# Patient Record
Sex: FEMALE | URBAN - METROPOLITAN AREA
[De-identification: names, ages, dates, MRNs, and addresses within clinical notes are randomized per-mention and may not be internally consistent; named-entity substitution may affect disease eponyms.]

---

## 2020-07-06 ENCOUNTER — APPOINTMENT (RX ONLY)
Dept: URBAN - METROPOLITAN AREA CLINIC 148 | Facility: CLINIC | Age: 20
Setting detail: DERMATOLOGY
End: 2020-07-06

## 2020-07-06 DIAGNOSIS — L70.0 ACNE VULGARIS: ICD-10-CM

## 2020-07-06 DIAGNOSIS — Z79.899 OTHER LONG TERM (CURRENT) DRUG THERAPY: ICD-10-CM

## 2020-07-06 PROCEDURE — ? ORDER TESTS

## 2020-07-06 PROCEDURE — ? COUNSELING

## 2020-07-06 PROCEDURE — 99203 OFFICE O/P NEW LOW 30 MIN: CPT

## 2020-07-06 PROCEDURE — ? ISOTRETINOIN MONITORING

## 2020-07-06 PROCEDURE — ? PRESCRIPTION

## 2020-07-06 PROCEDURE — ? HIGH RISK MEDICATION MONITORING

## 2020-07-06 RX ORDER — ISOTRETINOIN 30 MG/1
CAPSULE, LIQUID FILLED ORAL
Qty: 60 | Refills: 0 | Status: CANCELLED
Stop reason: ENTERED-IN-ERROR

## 2020-07-06 NOTE — PROCEDURE: HIGH RISK MEDICATION MONITORING
Simponi Counseling:  I discussed with the patient the risks of golimumab including but not limited to myelosuppression, immunosuppression, autoimmune hepatitis, demyelinating diseases, lymphoma, and serious infections.  The patient understands that monitoring is required including a PPD at baseline and must alert us or the primary physician if symptoms of infection or other concerning signs are noted.
Rituxan Pregnancy And Lactation Text: This medication is Pregnancy Category C and it isn't know if it is safe during pregnancy. It is unknown if this medication is excreted in breast milk but similar antibodies are known to be excreted.
Ivermectin Counseling:  Patient instructed to take medication on an empty stomach with a full glass of water.  Patient informed of potential adverse effects including but not limited to nausea, diarrhea, dizziness, itching, and swelling of the extremities or lymph nodes.  The patient verbalized understanding of the proper use and possible adverse effects of ivermectin.  All of the patient's questions and concerns were addressed.
Otezla Pregnancy And Lactation Text: This medication is Pregnancy Category C and it isn't known if it is safe during pregnancy. It is unknown if it is excreted in breast milk.
Hydroquinone Pregnancy And Lactation Text: This medication has not been assigned a Pregnancy Risk Category but animal studies failed to show danger with the topical medication. It is unknown if the medication is excreted in breast milk.
Infliximab Pregnancy And Lactation Text: This medication is Pregnancy Category B and is considered safe during pregnancy. It is unknown if this medication is excreted in breast milk.
Nsaids Pregnancy And Lactation Text: These medications are considered safe up to 30 weeks gestation. It is excreted in breast milk.
Minocycline Counseling: Patient advised regarding possible photosensitivity and discoloration of the teeth, skin, lips, tongue and gums.  Patient instructed to avoid sunlight, if possible.  When exposed to sunlight, patients should wear protective clothing, sunglasses, and sunscreen.  The patient was instructed to call the office immediately if the following severe adverse effects occur:  hearing changes, easy bruising/bleeding, severe headache, or vision changes.  The patient verbalized understanding of the proper use and possible adverse effects of minocycline.  All of the patient's questions and concerns were addressed.
Doxepin Counseling:  Patient advised that the medication is sedating and not to drive a car after taking this medication. Patient informed of potential adverse effects including but not limited to dry mouth, urinary retention, and blurry vision.  The patient verbalized understanding of the proper use and possible adverse effects of doxepin.  All of the patient's questions and concerns were addressed.
Zyclara Pregnancy And Lactation Text: This medication is Pregnancy Category C. It is unknown if this medication is excreted in breast milk.
Cimetidine Counseling:  I discussed with the patient the risks of Cimetidine including but not limited to gynecomastia, headache, diarrhea, nausea, drowsiness, arrhythmias, pancreatitis, skin rashes, psychosis, bone marrow suppression and kidney toxicity.
Bexarotene Pregnancy And Lactation Text: This medication is Pregnancy Category X and should not be given to women who are pregnant or may become pregnant. This medication should not be used if you are breast feeding.
Tetracycline Pregnancy And Lactation Text: This medication is Pregnancy Category D and not consider safe during pregnancy. It is also excreted in breast milk.
Clofazimine Counseling:  I discussed with the patient the risks of clofazimine including but not limited to skin and eye pigmentation, liver damage, nausea/vomiting, gastrointestinal bleeding and allergy.
Thalidomide Pregnancy And Lactation Text: This medication is Pregnancy Category X and is absolutely contraindicated during pregnancy. It is unknown if it is excreted in breast milk.
Use Enhanced Medication Counseling?: No
Griseofulvin Counseling:  I discussed with the patient the risks of griseofulvin including but not limited to photosensitivity, cytopenia, liver damage, nausea/vomiting and severe allergy.  The patient understands that this medication is best absorbed when taken with a fatty meal (e.g., ice cream or french fries).
Carac Counseling:  I discussed with the patient the risks of Carac including but not limited to erythema, scaling, itching, weeping, crusting, and pain.
Bexarotene Counseling:  I discussed with the patient the risks of bexarotene including but not limited to hair loss, dry lips/skin/eyes, liver abnormalities, hyperlipidemia, pancreatitis, depression/suicidal ideation, photosensitivity, drug rash/allergic reactions, hypothyroidism, anemia, leukopenia, infection, cataracts, and teratogenicity.  Patient understands that they will need regular blood tests to check lipid profile, liver function tests, white blood cell count, thyroid function tests and pregnancy test if applicable.
Cellcept Counseling:  I discussed with the patient the risks of mycophenolate mofetil including but not limited to infection/immunosuppression, GI upset, hypokalemia, hypercholesterolemia, bone marrow suppression, lymphoproliferative disorders, malignancy, GI ulceration/bleed/perforation, colitis, interstitial lung disease, kidney failure, progressive multifocal leukoencephalopathy, and birth defects.  The patient understands that monitoring is required including a baseline creatinine and regular CBC testing. In addition, patient must alert us immediately if symptoms of infection or other concerning signs are noted.
Arava Counseling:  Patient counseled regarding adverse effects of Arava including but not limited to nausea, vomiting, abnormalities in liver function tests. Patients may develop mouth sores, rash, diarrhea, and abnormalities in blood counts. The patient understands that monitoring is required including LFTs and blood counts.  There is a rare possibility of scarring of the liver and lung problems that can occur when taking methotrexate. Persistent nausea, loss of appetite, pale stools, dark urine, cough, and shortness of breath should be reported immediately. Patient advised to discontinue Arava treatment and consult with a physician prior to attempting conception. The patient will have to undergo a treatment to eliminate Arava from the body prior to conception.
Stelara Counseling:  I discussed with the patient the risks of ustekinumab including but not limited to immunosuppression, malignancy, posterior leukoencephalopathy syndrome, and serious infections.  The patient understands that monitoring is required including a PPD at baseline and must alert us or the primary physician if symptoms of infection or other concerning signs are noted.
Tetracycline Counseling: Patient counseled regarding possible photosensitivity and increased risk for sunburn.  Patient instructed to avoid sunlight, if possible.  When exposed to sunlight, patients should wear protective clothing, sunglasses, and sunscreen.  The patient was instructed to call the office immediately if the following severe adverse effects occur:  hearing changes, easy bruising/bleeding, severe headache, or vision changes.  The patient verbalized understanding of the proper use and possible adverse effects of tetracycline.  All of the patient's questions and concerns were addressed. Patient understands to avoid pregnancy while on therapy due to potential birth defects.
Protopic Pregnancy And Lactation Text: This medication is Pregnancy Category C. It is unknown if this medication is excreted in breast milk when applied topically.
Birth Control Pills Pregnancy And Lactation Text: This medication should be avoided if pregnant and for the first 30 days post-partum.
Gabapentin Counseling: I discussed with the patient the risks of gabapentin including but not limited to dizziness, somnolence, fatigue and ataxia.
Detail Level: Detailed
Itraconazole Pregnancy And Lactation Text: This medication is Pregnancy Category C and it isn't know if it is safe during pregnancy. It is also excreted in breast milk.
Quinolones Counseling:  I discussed with the patient the risks of fluoroquinolones including but not limited to GI upset, allergic reaction, drug rash, diarrhea, dizziness, photosensitivity, yeast infections, liver function test abnormalities, tendonitis/tendon rupture.
Taltz Counseling: I discussed with the patient the risks of ixekizumab including but not limited to immunosuppression, serious infections, worsening of inflammatory bowel disease and drug reactions.  The patient understands that monitoring is required including a PPD at baseline and must alert us or the primary physician if symptoms of infection or other concerning signs are noted.
Xeljonathanz Pregnancy And Lactation Text: This medication is Pregnancy Category D and is not considered safe during pregnancy.  The risk during breast feeding is also uncertain.
Tremfya Counseling: I discussed with the patient the risks of guselkumab including but not limited to immunosuppression, serious infections, worsening of inflammatory bowel disease and drug reactions.  The patient understands that monitoring is required including a PPD at baseline and must alert us or the primary physician if symptoms of infection or other concerning signs are noted.
Dapsone Counseling: I discussed with the patient the risks of dapsone including but not limited to hemolytic anemia, agranulocytosis, rashes, methemoglobinemia, kidney failure, peripheral neuropathy, headaches, GI upset, and liver toxicity.  Patients who start dapsone require monitoring including baseline LFTs and weekly CBCs for the first month, then every month thereafter.  The patient verbalized understanding of the proper use and possible adverse effects of dapsone.  All of the patient's questions and concerns were addressed.
Benzoyl Peroxide Counseling: Patient counseled that medicine may cause skin irritation and bleach clothing.  In the event of skin irritation, the patient was advised to reduce the amount of the drug applied or use it less frequently.   The patient verbalized understanding of the proper use and possible adverse effects of benzoyl peroxide.  All of the patient's questions and concerns were addressed.
Minoxidil Counseling: Minoxidil is a topical medication which can increase blood flow where it is applied. It is uncertain how this medication increases hair growth. Side effects are uncommon and include stinging and allergic reactions.
High Dose Vitamin A Pregnancy And Lactation Text: High dose vitamin A therapy is contraindicated during pregnancy and breast feeding.
Clindamycin Counseling: I counseled the patient regarding use of clindamycin as an antibiotic for prophylactic and/or therapeutic purposes. Clindamycin is active against numerous classes of bacteria, including skin bacteria. Side effects may include nausea, diarrhea, gastrointestinal upset, rash, hives, yeast infections, and in rare cases, colitis.
Hydroxyzine Counseling: Patient advised that the medication is sedating and not to drive a car after taking this medication.  Patient informed of potential adverse effects including but not limited to dry mouth, urinary retention, and blurry vision.  The patient verbalized understanding of the proper use and possible adverse effects of hydroxyzine.  All of the patient's questions and concerns were addressed.
Erythromycin Pregnancy And Lactation Text: This medication is Pregnancy Category B and is considered safe during pregnancy. It is also excreted in breast milk.
Imiquimod Counseling:  I discussed with the patient the risks of imiquimod including but not limited to erythema, scaling, itching, weeping, crusting, and pain.  Patient understands that the inflammatory response to imiquimod is variable from person to person and was educated regarded proper titration schedule.  If flu-like symptoms develop, patient knows to discontinue the medication and contact us.
Ketoconazole Pregnancy And Lactation Text: This medication is Pregnancy Category C and it isn't know if it is safe during pregnancy. It is also excreted in breast milk and breast feeding isn't recommended.
Gabapentin Pregnancy And Lactation Text: This medication is Pregnancy Category C and isn't considered safe during pregnancy. It is excreted in breast milk.
Zyclara Counseling:  I discussed with the patient the risks of imiquimod including but not limited to erythema, scaling, itching, weeping, crusting, and pain.  Patient understands that the inflammatory response to imiquimod is variable from person to person and was educated regarded proper titration schedule.  If flu-like symptoms develop, patient knows to discontinue the medication and contact us.
Doxycycline Counseling:  Patient counseled regarding possible photosensitivity and increased risk for sunburn.  Patient instructed to avoid sunlight, if possible.  When exposed to sunlight, patients should wear protective clothing, sunglasses, and sunscreen.  The patient was instructed to call the office immediately if the following severe adverse effects occur:  hearing changes, easy bruising/bleeding, severe headache, or vision changes.  The patient verbalized understanding of the proper use and possible adverse effects of doxycycline.  All of the patient's questions and concerns were addressed.
Terbinafine Counseling: Patient counseling regarding adverse effects of terbinafine including but not limited to headache, diarrhea, rash, upset stomach, liver function test abnormalities, itching, taste/smell disturbance, nausea, abdominal pain, and flatulence.  There is a rare possibility of liver failure that can occur when taking terbinafine.  The patient understands that a baseline LFT and kidney function test may be required. The patient verbalized understanding of the proper use and possible adverse effects of terbinafine.  All of the patient's questions and concerns were addressed.
Hydroxychloroquine Pregnancy And Lactation Text: This medication has been shown to cause fetal harm but it isn't assigned a Pregnancy Risk Category. There are small amounts excreted in breast milk.
Cimzia Pregnancy And Lactation Text: This medication crosses the placenta but can be considered safe in certain situations. Cimzia may be excreted in breast milk.
Hydroxychloroquine Counseling:  I discussed with the patient that a baseline ophthalmologic exam is needed at the start of therapy and every year thereafter while on therapy. A CBC may also be warranted for monitoring.  The side effects of this medication were discussed with the patient, including but not limited to agranulocytosis, aplastic anemia, seizures, rashes, retinopathy, and liver toxicity. Patient instructed to call the office should any adverse effect occur.  The patient verbalized understanding of the proper use and possible adverse effects of Plaquenil.  All the patient's questions and concerns were addressed.
Valtrex Pregnancy And Lactation Text: this medication is Pregnancy Category B and is considered safe during pregnancy. This medication is not directly found in breast milk but it's metabolite acyclovir is present.
Ilumya Counseling: I discussed with the patient the risks of tildrakizumab including but not limited to immunosuppression, malignancy, posterior leukoencephalopathy syndrome, and serious infections.  The patient understands that monitoring is required including a PPD at baseline and must alert us or the primary physician if symptoms of infection or other concerning signs are noted.
Itraconazole Counseling:  I discussed with the patient the risks of itraconazole including but not limited to liver damage, nausea/vomiting, neuropathy, and severe allergy.  The patient understands that this medication is best absorbed when taken with acidic beverages such as non-diet cola or ginger ale.  The patient understands that monitoring is required including baseline LFTs and repeat LFTs at intervals.  The patient understands that they are to contact us or the primary physician if concerning signs are noted.
Metronidazole Counseling:  I discussed with the patient the risks of metronidazole including but not limited to seizures, nausea/vomiting, a metallic taste in the mouth, nausea/vomiting and severe allergy.
Cephalexin Counseling: I counseled the patient regarding use of cephalexin as an antibiotic for prophylactic and/or therapeutic purposes. Cephalexin (commonly prescribed under brand name Keflex) is a cephalosporin antibiotic which is active against numerous classes of bacteria, including most skin bacteria. Side effects may include nausea, diarrhea, gastrointestinal upset, rash, hives, yeast infections, and in rare cases, hepatitis, kidney disease, seizures, fever, confusion, neurologic symptoms, and others. Patients with severe allergies to penicillin medications are cautioned that there is about a 10% incidence of cross-reactivity with cephalosporins. When possible, patients with penicillin allergies should use alternatives to cephalosporins for antibiotic therapy.
Rifampin Counseling: I discussed with the patient the risks of rifampin including but not limited to liver damage, kidney damage, red-orange body fluids, nausea/vomiting and severe allergy.
Glycopyrrolate Counseling:  I discussed with the patient the risks of glycopyrrolate including but not limited to skin rash, drowsiness, dry mouth, difficulty urinating, and blurred vision.
Nsaids Counseling: NSAID Counseling: I discussed with the patient that NSAIDs should be taken with food. Prolonged use of NSAIDs can result in the development of stomach ulcers.  Patient advised to stop taking NSAIDs if abdominal pain occurs.  The patient verbalized understanding of the proper use and possible adverse effects of NSAIDs.  All of the patient's questions and concerns were addressed.
Bactrim Pregnancy And Lactation Text: This medication is Pregnancy Category D and is known to cause fetal risk.  It is also excreted in breast milk.
Ilumya Pregnancy And Lactation Text: The risk during pregnancy and breastfeeding is uncertain with this medication.
Bactrim Counseling:  I discussed with the patient the risks of sulfa antibiotics including but not limited to GI upset, allergic reaction, drug rash, diarrhea, dizziness, photosensitivity, and yeast infections.  Rarely, more serious reactions can occur including but not limited to aplastic anemia, agranulocytosis, methemoglobinemia, blood dyscrasias, liver or kidney failure, lung infiltrates or desquamative/blistering drug rashes.
Isotretinoin Counseling: Patient should get monthly blood tests, not donate blood, not drive at night if vision affected, not share medication, and not undergo elective surgery for 6 months after tx completed. Side effects reviewed, pt to contact office should one occur.
Terbinafine Pregnancy And Lactation Text: This medication is Pregnancy Category B and is considered safe during pregnancy. It is also excreted in breast milk and breast feeding isn't recommended.
Spironolactone Counseling: Patient advised regarding risks of diarrhea, abdominal pain, hyperkalemia, birth defects (for female patients), liver toxicity and renal toxicity. The patient may need blood work to monitor liver and kidney function and potassium levels while on therapy. The patient verbalized understanding of the proper use and possible adverse effects of spironolactone.  All of the patient's questions and concerns were addressed.
Cellcept Pregnancy And Lactation Text: This medication is Pregnancy Category D and isn't considered safe during pregnancy. It is unknown if this medication is excreted in breast milk.
Clindamycin Pregnancy And Lactation Text: This medication can be used in pregnancy if certain situations. Clindamycin is also present in breast milk.
Topical Clindamycin Counseling: Patient counseled that this medication may cause skin irritation or allergic reactions.  In the event of skin irritation, the patient was advised to reduce the amount of the drug applied or use it less frequently.   The patient verbalized understanding of the proper use and possible adverse effects of clindamycin.  All of the patient's questions and concerns were addressed.
Skyrizi Counseling: I discussed with the patient the risks of risankizumab-rzaa including but not limited to immunosuppression, and serious infections.  The patient understands that monitoring is required including a PPD at baseline and must alert us or the primary physician if symptoms of infection or other concerning signs are noted.
5-Fu Pregnancy And Lactation Text: This medication is Pregnancy Category X and contraindicated in pregnancy and in women who may become pregnant. It is unknown if this medication is excreted in breast milk.
Cephalexin Pregnancy And Lactation Text: This medication is Pregnancy Category B and considered safe during pregnancy.  It is also excreted in breast milk but can be used safely for shorter doses.
Sski Pregnancy And Lactation Text: This medication is Pregnancy Category D and isn't considered safe during pregnancy. It is excreted in breast milk.
Spironolactone Pregnancy And Lactation Text: This medication can cause feminization of the male fetus and should be avoided during pregnancy. The active metabolite is also found in breast milk.
Hydroquinone Counseling:  Patient advised that medication may result in skin irritation, lightening (hypopigmentation), dryness, and burning.  In the event of skin irritation, the patient was advised to reduce the amount of the drug applied or use it less frequently.  Rarely, spots that are treated with hydroquinone can become darker (pseudoochronosis).  Should this occur, patient instructed to stop medication and call the office. The patient verbalized understanding of the proper use and possible adverse effects of hydroquinone.  All of the patient's questions and concerns were addressed.
Siliq Counseling:  I discussed with the patient the risks of Siliq including but not limited to new or worsening depression, suicidal thoughts and behavior, immunosuppression, malignancy, posterior leukoencephalopathy syndrome, and serious infections.  The patient understands that monitoring is required including a PPD at baseline and must alert us or the primary physician if symptoms of infection or other concerning signs are noted. There is also a special program designed to monitor depression which is required with Siliq.
Erivedge Counseling- I discussed with the patient the risks of Erivedge including but not limited to nausea, vomiting, diarrhea, constipation, weight loss, changes in the sense of taste, decreased appetite, muscle spasms, and hair loss.  The patient verbalized understanding of the proper use and possible adverse effects of Erivedge.  All of the patient's questions and concerns were addressed.
Oxybutynin Counseling:  I discussed with the patient the risks of oxybutynin including but not limited to skin rash, drowsiness, dry mouth, difficulty urinating, and blurred vision.
Doxycycline Pregnancy And Lactation Text: This medication is Pregnancy Category D and not consider safe during pregnancy. It is also excreted in breast milk but is considered safe for shorter treatment courses.
Tazorac Pregnancy And Lactation Text: This medication is not safe during pregnancy. It is unknown if this medication is excreted in breast milk.
Rituxan Counseling:  I discussed with the patient the risks of Rituxan infusions. Side effects can include infusion reactions, severe drug rashes including mucocutaneous reactions, reactivation of latent hepatitis and other infections and rarely progressive multifocal leukoencephalopathy.  All of the patient's questions and concerns were addressed.
Hydroxyzine Pregnancy And Lactation Text: This medication is not safe during pregnancy and should not be taken. It is also excreted in breast milk and breast feeding isn't recommended.
Dupixent Pregnancy And Lactation Text: This medication likely crosses the placenta but the risk for the fetus is uncertain. This medication is excreted in breast milk.
Cyclophosphamide Pregnancy And Lactation Text: This medication is Pregnancy Category D and it isn't considered safe during pregnancy. This medication is excreted in breast milk.
Eucrisa Counseling: Patient may experience a mild burning sensation during topical application. Eucrisa is not approved in children less than 2 years of age.
Protopic Counseling: Patient may experience a mild burning sensation during topical application. Protopic is not approved in children less than 2 years of age. There have been case reports of hematologic and skin malignancies in patients using topical calcineurin inhibitors although causality is questionable.
SSKI Counseling:  I discussed with the patient the risks of SSKI including but not limited to thyroid abnormalities, metallic taste, GI upset, fever, headache, acne, arthralgias, paraesthesias, lymphadenopathy, easy bleeding, arrhythmias, and allergic reaction.
Erythromycin Counseling:  I discussed with the patient the risks of erythromycin including but not limited to GI upset, allergic reaction, drug rash, diarrhea, increase in liver enzymes, and yeast infections.
Oxybutynin Pregnancy And Lactation Text: This medication is Pregnancy Category B and is considered safe during pregnancy. It is unknown if it is excreted in breast milk.
Doxepin Pregnancy And Lactation Text: This medication is Pregnancy Category C and it isn't known if it is safe during pregnancy. It is also excreted in breast milk and breast feeding isn't recommended.
Cosentyx Counseling:  I discussed with the patient the risks of Cosentyx including but not limited to worsening of Crohn's disease, immunosuppression, allergic reactions and infections.  The patient understands that monitoring is required including a PPD at baseline and must alert us or the primary physician if symptoms of infection or other concerning signs are noted.
Rifampin Pregnancy And Lactation Text: This medication is Pregnancy Category C and it isn't know if it is safe during pregnancy. It is also excreted in breast milk and should not be used if you are breast feeding.
Solaraze Pregnancy And Lactation Text: This medication is Pregnancy Category B and is considered safe. There is some data to suggest avoiding during the third trimester. It is unknown if this medication is excreted in breast milk.
Xolair Pregnancy And Lactation Text: This medication is Pregnancy Category B and is considered safe during pregnancy. This medication is excreted in breast milk.
Birth Control Pills Counseling: Birth Control Pill Counseling: I discussed with the patient the potential side effects of OCPs including but not limited to increased risk of stroke, heart attack, thrombophlebitis, deep venous thrombosis, hepatic adenomas, breast changes, GI upset, headaches, and depression.  The patient verbalized understanding of the proper use and possible adverse effects of OCPs. All of the patient's questions and concerns were addressed.
Azithromycin Counseling:  I discussed with the patient the risks of azithromycin including but not limited to GI upset, allergic reaction, drug rash, diarrhea, and yeast infections.
Xeljanz Counseling: I discussed with the patient the risks of Xeljanz therapy including increased risk of infection, liver issues, headache, diarrhea, or cold symptoms. Live vaccines should be avoided. They were instructed to call if they have any problems.
Xolair Counseling:  Patient informed of potential adverse effects including but not limited to fever, muscle aches, rash and allergic reactions.  The patient verbalized understanding of the proper use and possible adverse effects of Xolair.  All of the patient's questions and concerns were addressed.
Cyclosporine Counseling:  I discussed with the patient the risks of cyclosporine including but not limited to hypertension, gingival hyperplasia,myelosuppression, immunosuppression, liver damage, kidney damage, neurotoxicity, lymphoma, and serious infections. The patient understands that monitoring is required including baseline blood pressure, CBC, CMP, lipid panel and uric acid, and then 1-2 times monthly CMP and blood pressure.
Metronidazole Pregnancy And Lactation Text: This medication is Pregnancy Category B and considered safe during pregnancy.  It is also excreted in breast milk.
Acitretin Pregnancy And Lactation Text: This medication is Pregnancy Category X and should not be given to women who are pregnant or may become pregnant in the future. This medication is excreted in breast milk.
Cyclosporine Pregnancy And Lactation Text: This medication is Pregnancy Category C and it isn't know if it is safe during pregnancy. This medication is excreted in breast milk.
5-Fu Counseling: 5-Fluorouracil Counseling:  I discussed with the patient the risks of 5-fluorouracil including but not limited to erythema, scaling, itching, weeping, crusting, and pain.
Odomzo Counseling- I discussed with the patient the risks of Odomzo including but not limited to nausea, vomiting, diarrhea, constipation, weight loss, changes in the sense of taste, decreased appetite, muscle spasms, and hair loss.  The patient verbalized understanding of the proper use and possible adverse effects of Odomzo.  All of the patient's questions and concerns were addressed.
Methotrexate Counseling:  Patient counseled regarding adverse effects of methotrexate including but not limited to nausea, vomiting, abnormalities in liver function tests. Patients may develop mouth sores, rash, diarrhea, and abnormalities in blood counts. The patient understands that monitoring is required including LFT's and blood counts.  There is a rare possibility of scarring of the liver and lung problems that can occur when taking methotrexate. Persistent nausea, loss of appetite, pale stools, dark urine, cough, and shortness of breath should be reported immediately. Patient advised to discontinue methotrexate treatment at least three months before attempting to become pregnant.  I discussed the need for folate supplements while taking methotrexate.  These supplements can decrease side effects during methotrexate treatment. The patient verbalized understanding of the proper use and possible adverse effects of methotrexate.  All of the patient's questions and concerns were addressed.
Griseofulvin Pregnancy And Lactation Text: This medication is Pregnancy Category X and is known to cause serious birth defects. It is unknown if this medication is excreted in breast milk but breast feeding should be avoided.
Enbrel Counseling:  I discussed with the patient the risks of etanercept including but not limited to myelosuppression, immunosuppression, autoimmune hepatitis, demyelinating diseases, lymphoma, and infections.  The patient understands that monitoring is required including a PPD at baseline and must alert us or the primary physician if symptoms of infection or other concerning signs are noted.
Elidel Counseling: Patient may experience a mild burning sensation during topical application. Elidel is not approved in children less than 2 years of age. There have been case reports of hematologic and skin malignancies in patients using topical calcineurin inhibitors although causality is questionable.
Albendazole Pregnancy And Lactation Text: This medication is Pregnancy Category C and it isn't known if it is safe during pregnancy. It is also excreted in breast milk.
Dapsone Pregnancy And Lactation Text: This medication is Pregnancy Category C and is not considered safe during pregnancy or breast feeding.
Methotrexate Pregnancy And Lactation Text: This medication is Pregnancy Category X and is known to cause fetal harm. This medication is excreted in breast milk.
Thalidomide Counseling: I discussed with the patient the risks of thalidomide including but not limited to birth defects, anxiety, weakness, chest pain, dizziness, cough and severe allergy.
Picato Counseling:  I discussed with the patient the risks of Picato including but not limited to erythema, scaling, itching, weeping, crusting, and pain.
Ketoconazole Counseling:   Patient counseled regarding improving absorption with orange juice.  Adverse effects include but are not limited to breast enlargement, headache, diarrhea, nausea, upset stomach, liver function test abnormalities, taste disturbance, and stomach pain.  There is a rare possibility of liver failure that can occur when taking ketoconazole. The patient understands that monitoring of LFTs may be required, especially at baseline. The patient verbalized understanding of the proper use and possible adverse effects of ketoconazole.  All of the patient's questions and concerns were addressed.
Azithromycin Pregnancy And Lactation Text: This medication is considered safe during pregnancy and is also secreted in breast milk.
Drysol Counseling:  I discussed with the patient the risks of drysol/aluminum chloride including but not limited to skin rash, itching, irritation, burning.
High Dose Vitamin A Counseling: Side effects reviewed, pt to contact office should one occur.
Sarecycline Counseling: Patient advised regarding possible photosensitivity and discoloration of the teeth, skin, lips, tongue and gums.  Patient instructed to avoid sunlight, if possible.  When exposed to sunlight, patients should wear protective clothing, sunglasses, and sunscreen.  The patient was instructed to call the office immediately if the following severe adverse effects occur:  hearing changes, easy bruising/bleeding, severe headache, or vision changes.  The patient verbalized understanding of the proper use and possible adverse effects of sarecycline.  All of the patient's questions and concerns were addressed.
Glycopyrrolate Pregnancy And Lactation Text: This medication is Pregnancy Category B and is considered safe during pregnancy. It is unknown if it is excreted breast milk.
Solaraze Counseling:  I discussed with the patient the risks of Solaraze including but not limited to erythema, scaling, itching, weeping, crusting, and pain.
Fluconazole Counseling:  Patient counseled regarding adverse effects of fluconazole including but not limited to headache, diarrhea, nausea, upset stomach, liver function test abnormalities, taste disturbance, and stomach pain.  There is a rare possibility of liver failure that can occur when taking fluconazole.  The patient understands that monitoring of LFTs and kidney function test may be required, especially at baseline. The patient verbalized understanding of the proper use and possible adverse effects of fluconazole.  All of the patient's questions and concerns were addressed.
Benzoyl Peroxide Pregnancy And Lactation Text: This medication is Pregnancy Category C. It is unknown if benzoyl peroxide is excreted in breast milk.
Tazorac Counseling:  Patient advised that medication is irritating and drying.  Patient may need to apply sparingly and wash off after an hour before eventually leaving it on overnight.  The patient verbalized understanding of the proper use and possible adverse effects of tazorac.  All of the patient's questions and concerns were addressed.
Cyclophosphamide Counseling:  I discussed with the patient the risks of cyclophosphamide including but not limited to hair loss, hormonal abnormalities, decreased fertility, abdominal pain, diarrhea, nausea and vomiting, bone marrow suppression and infection. The patient understands that monitoring is required while taking this medication.
Topical Sulfur Applications Pregnancy And Lactation Text: This medication is Pregnancy Category C and has an unknown safety profile during pregnancy. It is unknown if this topical medication is excreted in breast milk.
Dupixent Counseling: I discussed with the patient the risks of dupilumab including but not limited to eye infection and irritation, cold sores, injection site reactions, worsening of asthma, allergic reactions and increased risk of parasitic infection.  Live vaccines should be avoided while taking dupilumab. Dupilumab will also interact with certain medications such as warfarin and cyclosporine. The patient understands that monitoring is required and they must alert us or the primary physician if symptoms of infection or other concerning signs are noted.
Colchicine Counseling:  Patient counseled regarding adverse effects including but not limited to stomach upset (nausea, vomiting, stomach pain, or diarrhea).  Patient instructed to limit alcohol consumption while taking this medication.  Colchicine may reduce blood counts especially with prolonged use.  The patient understands that monitoring of kidney function and blood counts may be required, especially at baseline. The patient verbalized understanding of the proper use and possible adverse effects of colchicine.  All of the patient's questions and concerns were addressed.
Acitretin Counseling:  I discussed with the patient the risks of acitretin including but not limited to hair loss, dry lips/skin/eyes, liver damage, hyperlipidemia, depression/suicidal ideation, photosensitivity.  Serious rare side effects can include but are not limited to pancreatitis, pseudotumor cerebri, bony changes, clot formation/stroke/heart attack.  Patient understands that alcohol is contraindicated since it can result in liver toxicity and significantly prolong the elimination of the drug by many years.
Otezla Counseling: The side effects of Otezla were discussed with the patient, including but not limited to worsening or new depression, weight loss, diarrhea, nausea, upper respiratory tract infection, and headache. Patient instructed to call the office should any adverse effect occur.  The patient verbalized understanding of the proper use and possible adverse effects of Otezla.  All the patient's questions and concerns were addressed.
Topical Retinoid counseling:  Patient advised to apply a pea-sized amount only at bedtime and wait 30 minutes after washing their face before applying.  If too drying, patient may add a non-comedogenic moisturizer. The patient verbalized understanding of the proper use and possible adverse effects of retinoids.  All of the patient's questions and concerns were addressed.
Cimzia Counseling:  I discussed with the patient the risks of Cimzia including but not limited to immunosuppression, allergic reactions and infections.  The patient understands that monitoring is required including a PPD at baseline and must alert us or the primary physician if symptoms of infection or other concerning signs are noted.
Azathioprine Counseling:  I discussed with the patient the risks of azathioprine including but not limited to myelosuppression, immunosuppression, hepatotoxicity, lymphoma, and infections.  The patient understands that monitoring is required including baseline LFTs, Creatinine, possible TPMP genotyping and weekly CBCs for the first month and then every 2 weeks thereafter.  The patient verbalized understanding of the proper use and possible adverse effects of azathioprine.  All of the patient's questions and concerns were addressed.
Isotretinoin Pregnancy And Lactation Text: This medication is Pregnancy Category X and is considered extremely dangerous during pregnancy. It is unknown if it is excreted in breast milk.
Prednisone Counseling:  I discussed with the patient the risks of prolonged use of prednisone including but not limited to weight gain, insomnia, osteoporosis, mood changes, diabetes, susceptibility to infection, glaucoma and high blood pressure.  In cases where prednisone use is prolonged, patients should be monitored with blood pressure checks, serum glucose levels and an eye exam.  Additionally, the patient may need to be placed on GI prophylaxis, PCP prophylaxis, and calcium and vitamin D supplementation and/or a bisphosphonate.  The patient verbalized understanding of the proper use and the possible adverse effects of prednisone.  All of the patient's questions and concerns were addressed.
Topical Sulfur Applications Counseling: Topical Sulfur Counseling: Patient counseled that this medication may cause skin irritation or allergic reactions.  In the event of skin irritation, the patient was advised to reduce the amount of the drug applied or use it less frequently.   The patient verbalized understanding of the proper use and possible adverse effects of topical sulfur application.  All of the patient's questions and concerns were addressed.
Humira Counseling:  I discussed with the patient the risks of adalimumab including but not limited to myelosuppression, immunosuppression, autoimmune hepatitis, demyelinating diseases, lymphoma, and serious infections.  The patient understands that monitoring is required including a PPD at baseline and must alert us or the primary physician if symptoms of infection or other concerning signs are noted.
Valtrex Counseling: I discussed with the patient the risks of valacyclovir including but not limited to kidney damage, nausea, vomiting and severe allergy.  The patient understands that if the infection seems to be worsening or is not improving, they are to call.
Infliximab Counseling:  I discussed with the patient the risks of infliximab including but not limited to myelosuppression, immunosuppression, autoimmune hepatitis, demyelinating diseases, lymphoma, and serious infections.  The patient understands that monitoring is required including a PPD at baseline and must alert us or the primary physician if symptoms of infection or other concerning signs are noted.
Albendazole Counseling:  I discussed with the patient the risks of albendazole including but not limited to cytopenia, kidney damage, nausea/vomiting and severe allergy.  The patient understands that this medication is being used in an off-label manner.
Drysol Pregnancy And Lactation Text: This medication is considered safe during pregnancy and breast feeding.

## 2020-07-06 NOTE — PROCEDURE: COUNSELING
Topical Clindamycin Pregnancy And Lactation Text: This medication is Pregnancy Category B and is considered safe during pregnancy. It is unknown if it is excreted in breast milk.
Minocycline Pregnancy And Lactation Text: This medication is Pregnancy Category D and not consider safe during pregnancy. It is also excreted in breast milk.
Dapsone Counseling: I discussed with the patient the risks of dapsone including but not limited to hemolytic anemia, agranulocytosis, rashes, methemoglobinemia, kidney failure, peripheral neuropathy, headaches, GI upset, and liver toxicity.  Patients who start dapsone require monitoring including baseline LFTs and weekly CBCs for the first month, then every month thereafter.  The patient verbalized understanding of the proper use and possible adverse effects of dapsone.  All of the patient's questions and concerns were addressed.
Topical Retinoid counseling:  Patient advised to apply a pea-sized amount only at bedtime and wait 30 minutes after washing their face before applying.  If too drying, patient may add a non-comedogenic moisturizer. The patient verbalized understanding of the proper use and possible adverse effects of retinoids.  All of the patient's questions and concerns were addressed.
Topical Sulfur Applications Pregnancy And Lactation Text: This medication is Pregnancy Category C and has an unknown safety profile during pregnancy. It is unknown if this topical medication is excreted in breast milk.
Doxycycline Counseling:  Patient counseled regarding possible photosensitivity and increased risk for sunburn.  Patient instructed to avoid sunlight, if possible.  When exposed to sunlight, patients should wear protective clothing, sunglasses, and sunscreen.  The patient was instructed to call the office immediately if the following severe adverse effects occur:  hearing changes, easy bruising/bleeding, severe headache, or vision changes.  The patient verbalized understanding of the proper use and possible adverse effects of doxycycline.  All of the patient's questions and concerns were addressed.
Doxycycline Pregnancy And Lactation Text: This medication is Pregnancy Category D and not consider safe during pregnancy. It is also excreted in breast milk but is considered safe for shorter treatment courses.
Azithromycin Pregnancy And Lactation Text: This medication is considered safe during pregnancy and is also secreted in breast milk.
High Dose Vitamin A Counseling: Side effects reviewed, pt to contact office should one occur.
Detail Level: Detailed
Benzoyl Peroxide Pregnancy And Lactation Text: This medication is Pregnancy Category C. It is unknown if benzoyl peroxide is excreted in breast milk.
Sarecycline Counseling: Patient advised regarding possible photosensitivity and discoloration of the teeth, skin, lips, tongue and gums.  Patient instructed to avoid sunlight, if possible.  When exposed to sunlight, patients should wear protective clothing, sunglasses, and sunscreen.  The patient was instructed to call the office immediately if the following severe adverse effects occur:  hearing changes, easy bruising/bleeding, severe headache, or vision changes.  The patient verbalized understanding of the proper use and possible adverse effects of sarecycline.  All of the patient's questions and concerns were addressed.
Spironolactone Pregnancy And Lactation Text: This medication can cause feminization of the male fetus and should be avoided during pregnancy. The active metabolite is also found in breast milk.
Isotretinoin Counseling: Patient should get monthly blood tests, not donate blood, not drive at night if vision affected, not share medication, and not undergo elective surgery for 6 months after tx completed. Side effects reviewed, pt to contact office should one occur.
Erythromycin Counseling:  I discussed with the patient the risks of erythromycin including but not limited to GI upset, allergic reaction, drug rash, diarrhea, increase in liver enzymes, and yeast infections.
Include Pregnancy/Lactation Warning?: No
Spironolactone Counseling: Patient advised regarding risks of diarrhea, abdominal pain, hyperkalemia, birth defects (for female patients), liver toxicity and renal toxicity. The patient may need blood work to monitor liver and kidney function and potassium levels while on therapy. The patient verbalized understanding of the proper use and possible adverse effects of spironolactone.  All of the patient's questions and concerns were addressed.
Isotretinoin Pregnancy And Lactation Text: This medication is Pregnancy Category X and is considered extremely dangerous during pregnancy. It is unknown if it is excreted in breast milk.
Bactrim Pregnancy And Lactation Text: This medication is Pregnancy Category D and is known to cause fetal risk.  It is also excreted in breast milk.
Birth Control Pills Pregnancy And Lactation Text: This medication should be avoided if pregnant and for the first 30 days post-partum.
Topical Clindamycin Counseling: Patient counseled that this medication may cause skin irritation or allergic reactions.  In the event of skin irritation, the patient was advised to reduce the amount of the drug applied or use it less frequently.   The patient verbalized understanding of the proper use and possible adverse effects of clindamycin.  All of the patient's questions and concerns were addressed.
Tazorac Counseling:  Patient advised that medication is irritating and drying.  Patient may need to apply sparingly and wash off after an hour before eventually leaving it on overnight.  The patient verbalized understanding of the proper use and possible adverse effects of tazorac.  All of the patient's questions and concerns were addressed.
Dapsone Pregnancy And Lactation Text: This medication is Pregnancy Category C and is not considered safe during pregnancy or breast feeding.
Topical Retinoid Pregnancy And Lactation Text: This medication is Pregnancy Category C. It is unknown if this medication is excreted in breast milk.
Bactrim Counseling:  I discussed with the patient the risks of sulfa antibiotics including but not limited to GI upset, allergic reaction, drug rash, diarrhea, dizziness, photosensitivity, and yeast infections.  Rarely, more serious reactions can occur including but not limited to aplastic anemia, agranulocytosis, methemoglobinemia, blood dyscrasias, liver or kidney failure, lung infiltrates or desquamative/blistering drug rashes.
High Dose Vitamin A Pregnancy And Lactation Text: High dose vitamin A therapy is contraindicated during pregnancy and breast feeding.
Minocycline Counseling: Patient advised regarding possible photosensitivity and discoloration of the teeth, skin, lips, tongue and gums.  Patient instructed to avoid sunlight, if possible.  When exposed to sunlight, patients should wear protective clothing, sunglasses, and sunscreen.  The patient was instructed to call the office immediately if the following severe adverse effects occur:  hearing changes, easy bruising/bleeding, severe headache, or vision changes.  The patient verbalized understanding of the proper use and possible adverse effects of minocycline.  All of the patient's questions and concerns were addressed.
Tetracycline Counseling: Patient counseled regarding possible photosensitivity and increased risk for sunburn.  Patient instructed to avoid sunlight, if possible.  When exposed to sunlight, patients should wear protective clothing, sunglasses, and sunscreen.  The patient was instructed to call the office immediately if the following severe adverse effects occur:  hearing changes, easy bruising/bleeding, severe headache, or vision changes.  The patient verbalized understanding of the proper use and possible adverse effects of tetracycline.  All of the patient's questions and concerns were addressed. Patient understands to avoid pregnancy while on therapy due to potential birth defects.
Tazorac Pregnancy And Lactation Text: This medication is not safe during pregnancy. It is unknown if this medication is excreted in breast milk.
Topical Sulfur Applications Counseling: Topical Sulfur Counseling: Patient counseled that this medication may cause skin irritation or allergic reactions.  In the event of skin irritation, the patient was advised to reduce the amount of the drug applied or use it less frequently.   The patient verbalized understanding of the proper use and possible adverse effects of topical sulfur application.  All of the patient's questions and concerns were addressed.
Benzoyl Peroxide Counseling: Patient counseled that medicine may cause skin irritation and bleach clothing.  In the event of skin irritation, the patient was advised to reduce the amount of the drug applied or use it less frequently.   The patient verbalized understanding of the proper use and possible adverse effects of benzoyl peroxide.  All of the patient's questions and concerns were addressed.
Erythromycin Pregnancy And Lactation Text: This medication is Pregnancy Category B and is considered safe during pregnancy. It is also excreted in breast milk.
Birth Control Pills Counseling: Birth Control Pill Counseling: I discussed with the patient the potential side effects of OCPs including but not limited to increased risk of stroke, heart attack, thrombophlebitis, deep venous thrombosis, hepatic adenomas, breast changes, GI upset, headaches, and depression.  The patient verbalized understanding of the proper use and possible adverse effects of OCPs. All of the patient's questions and concerns were addressed.
Azithromycin Counseling:  I discussed with the patient the risks of azithromycin including but not limited to GI upset, allergic reaction, drug rash, diarrhea, and yeast infections.

## 2020-07-06 NOTE — PROCEDURE: ISOTRETINOIN MONITORING
Any Myalgia?: No
Retinoid Dermatitis Normal Treatment: I recommended more frequent application of Cetaphil or CeraVe to the areas of dermatitis.
Nosebleeds Normal Treatment: I explained this is common when taking isotretinoin. I recommended saline mist in each nostril multiple times a day. If this worsens they will contact us.
Any Nosebleeds?: Yes - Normal Treatment
Hypercholesterolemia Monitoring: I explained this is common when taking isotretinoin. We will monitor closely.
Myalgia Monitoring: I explained this is common when taking isotretinoin. If this worsens they will contact us.
Female Pregnancy Counseling Text: Female patients should also be on two forms of birth control while taking this medication and for one month after their last dose.
Dosing Month 2 (Required For Cumulative Dosing): 30mg BID
Add Associated Diagnosis When Managing Medication Side Effects: Yes
Ipledge Number (Optional): [id number]
Next Month's Dosage: Continue Current Dosage
Cheilitis Aggressive Treatment: I recommended application of Vaseline or Aquaphor numerous times a day (as often as every hour) and before going to bed. I also prescribed a topical steroid for twice daily use.
Lower Range (In Mg/Kg): 120
Pounds Preamble Statement (Weight Entered In Details Tab): Reported Weight in pounds:
Upper Range (In Mg/Kg): 150
Hypertriglyceridemia Treatment: I explained this is common when taking isotretinoin. If this worsens they will contact us. They may try OTC ibuprofen.
Xerosis Aggressive Treatment: I recommended application of Cetaphil or CeraVe numerous times a day going to bed to all dry areas. I also prescribed a topical steroid for twice daily use.
Xerosis Normal Treatment: I recommended application of Cetaphil or CeraVe numerous times a day and before going to bed to all dry areas.
Kilograms Preamble Statement (Weight Entered In Details Tab): Reported Weight in kilograms:
Female Completion Statement: After discussing her treatment course we decided to discontinue isotretinoin therapy at this time. I explained that she would need to continue her birth control methods for at least one month after the last dosage. She should also get a pregnancy test one month after the last dose. She shouldn't donate blood for one month after the last dose. She should call with any new symptoms of depression.
Are Labs Available For Review?: No- Not Drawn Yet
Months Of Therapy Completed: 3
Weight Units: pounds
Detail Level: Detailed
Headache Monitoring: I recommended monitoring the headaches for now. There is no evidence of increased intracranial pressure. They were instructed to call if the headaches are worsening.
Target Cumulative Dosage (In Mg/Kg): 135
What Is The Patient's Gender: Female
Retinoid Dermatitis Aggressive Treatment: I recommended more frequent application of Cetaphil or CeraVe to the areas of dermatitis. I also prescribed a topical steroid for twice daily use until the dermatitis resolves.
Use Therapeutic Ranged Or Therapeutic Target: please select Range or Target
Male Completion Statement: After discussing his treatment course we decided to discontinue isotretinoin therapy at this time. He shouldn't donate blood for one month after the last dose. He should call with any new symptoms of depression.
Counseling Text: I reviewed the side effect in detail. Patient should get monthly blood tests, not donate blood, not drive at night if vision affected, and not share medication.
Comments: [place]. Per patient she is on 2nd month of therapy. Patient to request records to confirm dosage. Will need blood work completed and can be cleared in ipledge after confirmation.\\nIf labs normal will continue 30mgbid
Xerosis Normal Treatment: I recommended application of Cetaphil or CeraVe numerous times a day going to bed to all dry areas.
Cheilitis Normal Treatment: I recommended application of Vaseline or Aquaphor numerous times a day (as often as every hour) and before going to bed.
Xerosis Aggressive Treatment: I recommended application of Cetaphil or CeraVe numerous times a day and before going to bed to all dry areas. I also prescribed a topical steroid for twice daily use.
Patient Weight (Optional But Required For Cumulative Dose-Numbers And Decimals Only): 140

## 2020-07-21 ENCOUNTER — APPOINTMENT (RX ONLY)
Dept: URBAN - METROPOLITAN AREA CLINIC 148 | Facility: CLINIC | Age: 20
Setting detail: DERMATOLOGY
End: 2020-07-21

## 2020-07-21 DIAGNOSIS — L70.0 ACNE VULGARIS: ICD-10-CM

## 2020-07-21 DIAGNOSIS — Z79.899 OTHER LONG TERM (CURRENT) DRUG THERAPY: ICD-10-CM

## 2020-07-21 PROCEDURE — ? COUNSELING

## 2020-07-21 PROCEDURE — ? ISOTRETINOIN MONITORING

## 2020-07-21 PROCEDURE — ? ORDER TESTS

## 2020-07-21 PROCEDURE — ? PRESCRIPTION

## 2020-07-21 PROCEDURE — 99214 OFFICE O/P EST MOD 30 MIN: CPT

## 2020-07-21 RX ORDER — ISOTRETINOIN 30 MG/1
CAPSULE, LIQUID FILLED ORAL
Qty: 60 | Refills: 0 | Status: ERX | COMMUNITY
Start: 2020-07-21

## 2020-07-21 RX ADMIN — ISOTRETINOIN: 30 CAPSULE, LIQUID FILLED ORAL at 00:00

## 2020-07-21 NOTE — PROCEDURE: ISOTRETINOIN MONITORING
Female Pregnancy Counseling Text: Female patients should also be on two forms of birth control while taking this medication and for one month after their last dose.
Show How Many Months Of Anticipated Therapy Are Left: No
Detail Level: Detailed
Retinoid Dermatitis Aggressive Treatment: I recommended more frequent application of Cetaphil or CeraVe to the areas of dermatitis. I also prescribed a topical steroid for twice daily use until the dermatitis resolves.
Are Labs Available For Review?: Yes
Ipledge Number (Optional): [id number]
Hypertriglyceridemia Monitoring: I explained this is common when taking isotretinoin. We will monitor closely.
Hypertriglyceridemia Monitoring: I explained this is common when taking isotretinoin. If this worsens they will contact us.
Myalgia Treatment: I explained this is common when taking isotretinoin. If this worsens they will contact us. They may try OTC ibuprofen.
Male Completion Statement: After discussing his treatment course we decided to discontinue isotretinoin therapy at this time. He shouldn't donate blood for one month after the last dose. He should call with any new symptoms of depression.
Comments: Patient completed 2 months of Accutane 30mg BID with out of state provider. Paperwork was not faxed to us on time therefore we were not able to continue treatment last visit. Will continue with the 3rd of month of therapy.
Dosing Month 3 (Required For Cumulative Dosing): 30mg BID
Xerosis Normal Treatment: I recommended application of Cetaphil or CeraVe numerous times a day going to bed to all dry areas.
Kilograms Preamble Statement (Weight Entered In Details Tab): Reported Weight in kilograms:
Nosebleeds Normal Treatment: I explained this is common when taking isotretinoin. I recommended saline mist in each nostril multiple times a day. If this worsens they will contact us.
Female Completion Statement: After discussing her treatment course we decided to discontinue isotretinoin therapy at this time. I explained that she would need to continue her birth control methods for at least one month after the last dosage. She should also get a pregnancy test one month after the last dose. She shouldn't donate blood for one month after the last dose. She should call with any new symptoms of depression.
Lower Range (In Mg/Kg): 120
Cheilitis Normal Treatment: I recommended application of Vaseline or Aquaphor numerous times a day (as often as every hour) and before going to bed.
Cheilitis Aggressive Treatment: I recommended application of Vaseline or Aquaphor numerous times a day (as often as every hour) and before going to bed. I also prescribed a topical steroid for twice daily use.
Headache Monitoring: I recommended monitoring the headaches for now. There is no evidence of increased intracranial pressure. They were instructed to call if the headaches are worsening.
Upper Range (In Mg/Kg): 150
Any Nosebleeds?: Yes - Normal Treatment
Retinoid Dermatitis Normal Treatment: I recommended more frequent application of Cetaphil or CeraVe to the areas of dermatitis.
Xerosis Normal Treatment: I recommended application of Cetaphil or CeraVe numerous times a day and before going to bed to all dry areas.
Target Cumulative Dosage (In Mg/Kg): 135
Weight Units: pounds
Months Of Therapy Completed: 3
Next Month's Dosage: Continue Current Dosage
Counseling Text: I reviewed the side effect in detail. Patient should get monthly blood tests, not donate blood, not drive at night if vision affected, and not share medication.
Use Therapeutic Ranged Or Therapeutic Target: please select Range or Target
Pounds Preamble Statement (Weight Entered In Details Tab): Reported Weight in pounds:
Patient Weight (Optional But Required For Cumulative Dose-Numbers And Decimals Only): 140
Xerosis Aggressive Treatment: I recommended application of Cetaphil or CeraVe numerous times a day and before going to bed to all dry areas. I also prescribed a topical steroid for twice daily use.
What Is The Patient's Gender: Female
Xerosis Aggressive Treatment: I recommended application of Cetaphil or CeraVe numerous times a day going to bed to all dry areas. I also prescribed a topical steroid for twice daily use.

## 2020-07-21 NOTE — PROCEDURE: HIGH RISK MEDICATION MONITORING
Clindamycin Counseling: I counseled the patient regarding use of clindamycin as an antibiotic for prophylactic and/or therapeutic purposes. Clindamycin is active against numerous classes of bacteria, including skin bacteria. Side effects may include nausea, diarrhea, gastrointestinal upset, rash, hives, yeast infections, and in rare cases, colitis.
Zyclara Counseling:  I discussed with the patient the risks of imiquimod including but not limited to erythema, scaling, itching, weeping, crusting, and pain.  Patient understands that the inflammatory response to imiquimod is variable from person to person and was educated regarded proper titration schedule.  If flu-like symptoms develop, patient knows to discontinue the medication and contact us.
Arava Counseling:  Patient counseled regarding adverse effects of Arava including but not limited to nausea, vomiting, abnormalities in liver function tests. Patients may develop mouth sores, rash, diarrhea, and abnormalities in blood counts. The patient understands that monitoring is required including LFTs and blood counts.  There is a rare possibility of scarring of the liver and lung problems that can occur when taking methotrexate. Persistent nausea, loss of appetite, pale stools, dark urine, cough, and shortness of breath should be reported immediately. Patient advised to discontinue Arava treatment and consult with a physician prior to attempting conception. The patient will have to undergo a treatment to eliminate Arava from the body prior to conception.
Spironolactone Pregnancy And Lactation Text: This medication can cause feminization of the male fetus and should be avoided during pregnancy. The active metabolite is also found in breast milk.
Methotrexate Pregnancy And Lactation Text: This medication is Pregnancy Category X and is known to cause fetal harm. This medication is excreted in breast milk.
Elidel Pregnancy And Lactation Text: This medication is Pregnancy Category C. It is unknown if this medication is excreted in breast milk.
Infliximab Counseling:  I discussed with the patient the risks of infliximab including but not limited to myelosuppression, immunosuppression, autoimmune hepatitis, demyelinating diseases, lymphoma, and serious infections.  The patient understands that monitoring is required including a PPD at baseline and must alert us or the primary physician if symptoms of infection or other concerning signs are noted.
Quinolones Counseling:  I discussed with the patient the risks of fluoroquinolones including but not limited to GI upset, allergic reaction, drug rash, diarrhea, dizziness, photosensitivity, yeast infections, liver function test abnormalities, tendonitis/tendon rupture.
Carac Pregnancy And Lactation Text: This medication is Pregnancy Category X and contraindicated in pregnancy and in women who may become pregnant. It is unknown if this medication is excreted in breast milk.
Doxepin Pregnancy And Lactation Text: This medication is Pregnancy Category C and it isn't known if it is safe during pregnancy. It is also excreted in breast milk and breast feeding isn't recommended.
Dupixent Counseling: I discussed with the patient the risks of dupilumab including but not limited to eye infection and irritation, cold sores, injection site reactions, worsening of asthma, allergic reactions and increased risk of parasitic infection.  Live vaccines should be avoided while taking dupilumab. Dupilumab will also interact with certain medications such as warfarin and cyclosporine. The patient understands that monitoring is required and they must alert us or the primary physician if symptoms of infection or other concerning signs are noted.
Oxybutynin Pregnancy And Lactation Text: This medication is Pregnancy Category B and is considered safe during pregnancy. It is unknown if it is excreted in breast milk.
Thalidomide Counseling: I discussed with the patient the risks of thalidomide including but not limited to birth defects, anxiety, weakness, chest pain, dizziness, cough and severe allergy.
Cimetidine Counseling:  I discussed with the patient the risks of Cimetidine including but not limited to gynecomastia, headache, diarrhea, nausea, drowsiness, arrhythmias, pancreatitis, skin rashes, psychosis, bone marrow suppression and kidney toxicity.
Spironolactone Counseling: Patient advised regarding risks of diarrhea, abdominal pain, hyperkalemia, birth defects (for female patients), liver toxicity and renal toxicity. The patient may need blood work to monitor liver and kidney function and potassium levels while on therapy. The patient verbalized understanding of the proper use and possible adverse effects of spironolactone.  All of the patient's questions and concerns were addressed.
Cephalexin Pregnancy And Lactation Text: This medication is Pregnancy Category B and considered safe during pregnancy.  It is also excreted in breast milk but can be used safely for shorter doses.
Hydroxychloroquine Counseling:  I discussed with the patient that a baseline ophthalmologic exam is needed at the start of therapy and every year thereafter while on therapy. A CBC may also be warranted for monitoring.  The side effects of this medication were discussed with the patient, including but not limited to agranulocytosis, aplastic anemia, seizures, rashes, retinopathy, and liver toxicity. Patient instructed to call the office should any adverse effect occur.  The patient verbalized understanding of the proper use and possible adverse effects of Plaquenil.  All the patient's questions and concerns were addressed.
Nsaids Counseling: NSAID Counseling: I discussed with the patient that NSAIDs should be taken with food. Prolonged use of NSAIDs can result in the development of stomach ulcers.  Patient advised to stop taking NSAIDs if abdominal pain occurs.  The patient verbalized understanding of the proper use and possible adverse effects of NSAIDs.  All of the patient's questions and concerns were addressed.
Protopic Pregnancy And Lactation Text: This medication is Pregnancy Category C. It is unknown if this medication is excreted in breast milk when applied topically.
Metronidazole Counseling:  I discussed with the patient the risks of metronidazole including but not limited to seizures, nausea/vomiting, a metallic taste in the mouth, nausea/vomiting and severe allergy.
Fluconazole Pregnancy And Lactation Text: This medication is Pregnancy Category C and it isn't know if it is safe during pregnancy. It is also excreted in breast milk.
Cosentyx Counseling:  I discussed with the patient the risks of Cosentyx including but not limited to worsening of Crohn's disease, immunosuppression, allergic reactions and infections.  The patient understands that monitoring is required including a PPD at baseline and must alert us or the primary physician if symptoms of infection or other concerning signs are noted.
Isotretinoin Pregnancy And Lactation Text: This medication is Pregnancy Category X and is considered extremely dangerous during pregnancy. It is unknown if it is excreted in breast milk.
Benzoyl Peroxide Counseling: Patient counseled that medicine may cause skin irritation and bleach clothing.  In the event of skin irritation, the patient was advised to reduce the amount of the drug applied or use it less frequently.   The patient verbalized understanding of the proper use and possible adverse effects of benzoyl peroxide.  All of the patient's questions and concerns were addressed.
Azithromycin Pregnancy And Lactation Text: This medication is considered safe during pregnancy and is also secreted in breast milk.
Xeljonathanz Pregnancy And Lactation Text: This medication is Pregnancy Category D and is not considered safe during pregnancy.  The risk during breast feeding is also uncertain.
Enbrel Counseling:  I discussed with the patient the risks of etanercept including but not limited to myelosuppression, immunosuppression, autoimmune hepatitis, demyelinating diseases, lymphoma, and infections.  The patient understands that monitoring is required including a PPD at baseline and must alert us or the primary physician if symptoms of infection or other concerning signs are noted.
Sarecycline Counseling: Patient advised regarding possible photosensitivity and discoloration of the teeth, skin, lips, tongue and gums.  Patient instructed to avoid sunlight, if possible.  When exposed to sunlight, patients should wear protective clothing, sunglasses, and sunscreen.  The patient was instructed to call the office immediately if the following severe adverse effects occur:  hearing changes, easy bruising/bleeding, severe headache, or vision changes.  The patient verbalized understanding of the proper use and possible adverse effects of sarecycline.  All of the patient's questions and concerns were addressed.
Topical Clindamycin Counseling: Patient counseled that this medication may cause skin irritation or allergic reactions.  In the event of skin irritation, the patient was advised to reduce the amount of the drug applied or use it less frequently.   The patient verbalized understanding of the proper use and possible adverse effects of clindamycin.  All of the patient's questions and concerns were addressed.
Drysol Counseling:  I discussed with the patient the risks of drysol/aluminum chloride including but not limited to skin rash, itching, irritation, burning.
Otezla Pregnancy And Lactation Text: This medication is Pregnancy Category C and it isn't known if it is safe during pregnancy. It is unknown if it is excreted in breast milk.
Tetracycline Counseling: Patient counseled regarding possible photosensitivity and increased risk for sunburn.  Patient instructed to avoid sunlight, if possible.  When exposed to sunlight, patients should wear protective clothing, sunglasses, and sunscreen.  The patient was instructed to call the office immediately if the following severe adverse effects occur:  hearing changes, easy bruising/bleeding, severe headache, or vision changes.  The patient verbalized understanding of the proper use and possible adverse effects of tetracycline.  All of the patient's questions and concerns were addressed. Patient understands to avoid pregnancy while on therapy due to potential birth defects.
Prednisone Pregnancy And Lactation Text: This medication is Pregnancy Category C and it isn't know if it is safe during pregnancy. This medication is excreted in breast milk.
Ketoconazole Pregnancy And Lactation Text: This medication is Pregnancy Category C and it isn't know if it is safe during pregnancy. It is also excreted in breast milk and breast feeding isn't recommended.
Albendazole Pregnancy And Lactation Text: This medication is Pregnancy Category C and it isn't known if it is safe during pregnancy. It is also excreted in breast milk.
Otezla Counseling: The side effects of Otezla were discussed with the patient, including but not limited to worsening or new depression, weight loss, diarrhea, nausea, upper respiratory tract infection, and headache. Patient instructed to call the office should any adverse effect occur.  The patient verbalized understanding of the proper use and possible adverse effects of Otezla.  All the patient's questions and concerns were addressed.
Azathioprine Counseling:  I discussed with the patient the risks of azathioprine including but not limited to myelosuppression, immunosuppression, hepatotoxicity, lymphoma, and infections.  The patient understands that monitoring is required including baseline LFTs, Creatinine, possible TPMP genotyping and weekly CBCs for the first month and then every 2 weeks thereafter.  The patient verbalized understanding of the proper use and possible adverse effects of azathioprine.  All of the patient's questions and concerns were addressed.
Siliq Pregnancy And Lactation Text: The risk during pregnancy and breastfeeding is uncertain with this medication.
Cyclophosphamide Counseling:  I discussed with the patient the risks of cyclophosphamide including but not limited to hair loss, hormonal abnormalities, decreased fertility, abdominal pain, diarrhea, nausea and vomiting, bone marrow suppression and infection. The patient understands that monitoring is required while taking this medication.
Imiquimod Counseling:  I discussed with the patient the risks of imiquimod including but not limited to erythema, scaling, itching, weeping, crusting, and pain.  Patient understands that the inflammatory response to imiquimod is variable from person to person and was educated regarded proper titration schedule.  If flu-like symptoms develop, patient knows to discontinue the medication and contact us.
Siliq Counseling:  I discussed with the patient the risks of Siliq including but not limited to new or worsening depression, suicidal thoughts and behavior, immunosuppression, malignancy, posterior leukoencephalopathy syndrome, and serious infections.  The patient understands that monitoring is required including a PPD at baseline and must alert us or the primary physician if symptoms of infection or other concerning signs are noted. There is also a special program designed to monitor depression which is required with Siliq.
Bactrim Counseling:  I discussed with the patient the risks of sulfa antibiotics including but not limited to GI upset, allergic reaction, drug rash, diarrhea, dizziness, photosensitivity, and yeast infections.  Rarely, more serious reactions can occur including but not limited to aplastic anemia, agranulocytosis, methemoglobinemia, blood dyscrasias, liver or kidney failure, lung infiltrates or desquamative/blistering drug rashes.
Taltz Counseling: I discussed with the patient the risks of ixekizumab including but not limited to immunosuppression, serious infections, worsening of inflammatory bowel disease and drug reactions.  The patient understands that monitoring is required including a PPD at baseline and must alert us or the primary physician if symptoms of infection or other concerning signs are noted.
Stelara Counseling:  I discussed with the patient the risks of ustekinumab including but not limited to immunosuppression, malignancy, posterior leukoencephalopathy syndrome, and serious infections.  The patient understands that monitoring is required including a PPD at baseline and must alert us or the primary physician if symptoms of infection or other concerning signs are noted.
Xolair Counseling:  Patient informed of potential adverse effects including but not limited to fever, muscle aches, rash and allergic reactions.  The patient verbalized understanding of the proper use and possible adverse effects of Xolair.  All of the patient's questions and concerns were addressed.
Terbinafine Pregnancy And Lactation Text: This medication is Pregnancy Category B and is considered safe during pregnancy. It is also excreted in breast milk and breast feeding isn't recommended.
Topical Retinoid counseling:  Patient advised to apply a pea-sized amount only at bedtime and wait 30 minutes after washing their face before applying.  If too drying, patient may add a non-comedogenic moisturizer. The patient verbalized understanding of the proper use and possible adverse effects of retinoids.  All of the patient's questions and concerns were addressed.
Bexarotene Counseling:  I discussed with the patient the risks of bexarotene including but not limited to hair loss, dry lips/skin/eyes, liver abnormalities, hyperlipidemia, pancreatitis, depression/suicidal ideation, photosensitivity, drug rash/allergic reactions, hypothyroidism, anemia, leukopenia, infection, cataracts, and teratogenicity.  Patient understands that they will need regular blood tests to check lipid profile, liver function tests, white blood cell count, thyroid function tests and pregnancy test if applicable.
Tetracycline Pregnancy And Lactation Text: This medication is Pregnancy Category D and not consider safe during pregnancy. It is also excreted in breast milk.
Elidel Counseling: Patient may experience a mild burning sensation during topical application. Elidel is not approved in children less than 2 years of age. There have been case reports of hematologic and skin malignancies in patients using topical calcineurin inhibitors although causality is questionable.
Glycopyrrolate Counseling:  I discussed with the patient the risks of glycopyrrolate including but not limited to skin rash, drowsiness, dry mouth, difficulty urinating, and blurred vision.
Erivedge Counseling- I discussed with the patient the risks of Erivedge including but not limited to nausea, vomiting, diarrhea, constipation, weight loss, changes in the sense of taste, decreased appetite, muscle spasms, and hair loss.  The patient verbalized understanding of the proper use and possible adverse effects of Erivedge.  All of the patient's questions and concerns were addressed.
Rituxan Pregnancy And Lactation Text: This medication is Pregnancy Category C and it isn't know if it is safe during pregnancy. It is unknown if this medication is excreted in breast milk but similar antibodies are known to be excreted.
Carac Counseling:  I discussed with the patient the risks of Carac including but not limited to erythema, scaling, itching, weeping, crusting, and pain.
Fluconazole Counseling:  Patient counseled regarding adverse effects of fluconazole including but not limited to headache, diarrhea, nausea, upset stomach, liver function test abnormalities, taste disturbance, and stomach pain.  There is a rare possibility of liver failure that can occur when taking fluconazole.  The patient understands that monitoring of LFTs and kidney function test may be required, especially at baseline. The patient verbalized understanding of the proper use and possible adverse effects of fluconazole.  All of the patient's questions and concerns were addressed.
Doxycycline Pregnancy And Lactation Text: This medication is Pregnancy Category D and not consider safe during pregnancy. It is also excreted in breast milk but is considered safe for shorter treatment courses.
Odomzo Counseling- I discussed with the patient the risks of Odomzo including but not limited to nausea, vomiting, diarrhea, constipation, weight loss, changes in the sense of taste, decreased appetite, muscle spasms, and hair loss.  The patient verbalized understanding of the proper use and possible adverse effects of Odomzo.  All of the patient's questions and concerns were addressed.
Valtrex Pregnancy And Lactation Text: this medication is Pregnancy Category B and is considered safe during pregnancy. This medication is not directly found in breast milk but it's metabolite acyclovir is present.
Dupixent Pregnancy And Lactation Text: This medication likely crosses the placenta but the risk for the fetus is uncertain. This medication is excreted in breast milk.
Doxepin Counseling:  Patient advised that the medication is sedating and not to drive a car after taking this medication. Patient informed of potential adverse effects including but not limited to dry mouth, urinary retention, and blurry vision.  The patient verbalized understanding of the proper use and possible adverse effects of doxepin.  All of the patient's questions and concerns were addressed.
Prednisone Counseling:  I discussed with the patient the risks of prolonged use of prednisone including but not limited to weight gain, insomnia, osteoporosis, mood changes, diabetes, susceptibility to infection, glaucoma and high blood pressure.  In cases where prednisone use is prolonged, patients should be monitored with blood pressure checks, serum glucose levels and an eye exam.  Additionally, the patient may need to be placed on GI prophylaxis, PCP prophylaxis, and calcium and vitamin D supplementation and/or a bisphosphonate.  The patient verbalized understanding of the proper use and the possible adverse effects of prednisone.  All of the patient's questions and concerns were addressed.
Sski Pregnancy And Lactation Text: This medication is Pregnancy Category D and isn't considered safe during pregnancy. It is excreted in breast milk.
Hydroquinone Counseling:  Patient advised that medication may result in skin irritation, lightening (hypopigmentation), dryness, and burning.  In the event of skin irritation, the patient was advised to reduce the amount of the drug applied or use it less frequently.  Rarely, spots that are treated with hydroquinone can become darker (pseudoochronosis).  Should this occur, patient instructed to stop medication and call the office. The patient verbalized understanding of the proper use and possible adverse effects of hydroquinone.  All of the patient's questions and concerns were addressed.
Humira Counseling:  I discussed with the patient the risks of adalimumab including but not limited to myelosuppression, immunosuppression, autoimmune hepatitis, demyelinating diseases, lymphoma, and serious infections.  The patient understands that monitoring is required including a PPD at baseline and must alert us or the primary physician if symptoms of infection or other concerning signs are noted.
Bactrim Pregnancy And Lactation Text: This medication is Pregnancy Category D and is known to cause fetal risk.  It is also excreted in breast milk.
Hydroxyzine Pregnancy And Lactation Text: This medication is not safe during pregnancy and should not be taken. It is also excreted in breast milk and breast feeding isn't recommended.
Humira Pregnancy And Lactation Text: This medication is Pregnancy Category B and is considered safe during pregnancy. It is unknown if this medication is excreted in breast milk.
Erythromycin Pregnancy And Lactation Text: This medication is Pregnancy Category B and is considered safe during pregnancy. It is also excreted in breast milk.
Methotrexate Counseling:  Patient counseled regarding adverse effects of methotrexate including but not limited to nausea, vomiting, abnormalities in liver function tests. Patients may develop mouth sores, rash, diarrhea, and abnormalities in blood counts. The patient understands that monitoring is required including LFT's and blood counts.  There is a rare possibility of scarring of the liver and lung problems that can occur when taking methotrexate. Persistent nausea, loss of appetite, pale stools, dark urine, cough, and shortness of breath should be reported immediately. Patient advised to discontinue methotrexate treatment at least three months before attempting to become pregnant.  I discussed the need for folate supplements while taking methotrexate.  These supplements can decrease side effects during methotrexate treatment. The patient verbalized understanding of the proper use and possible adverse effects of methotrexate.  All of the patient's questions and concerns were addressed.
Odomzo Pregnancy And Lactation Text: This medication is Pregnancy Category X and is absolutely contraindicated during pregnancy. It is unknown if it is excreted in breast milk.
Clindamycin Pregnancy And Lactation Text: This medication can be used in pregnancy if certain situations. Clindamycin is also present in breast milk.
Cimzia Counseling:  I discussed with the patient the risks of Cimzia including but not limited to immunosuppression, allergic reactions and infections.  The patient understands that monitoring is required including a PPD at baseline and must alert us or the primary physician if symptoms of infection or other concerning signs are noted.
Include Pregnancy/Lactation Warning?: No
Rifampin Pregnancy And Lactation Text: This medication is Pregnancy Category C and it isn't know if it is safe during pregnancy. It is also excreted in breast milk and should not be used if you are breast feeding.
Oxybutynin Counseling:  I discussed with the patient the risks of oxybutynin including but not limited to skin rash, drowsiness, dry mouth, difficulty urinating, and blurred vision.
Tazorac Pregnancy And Lactation Text: This medication is not safe during pregnancy. It is unknown if this medication is excreted in breast milk.
Solaraze Pregnancy And Lactation Text: This medication is Pregnancy Category B and is considered safe. There is some data to suggest avoiding during the third trimester. It is unknown if this medication is excreted in breast milk.
Xolair Pregnancy And Lactation Text: This medication is Pregnancy Category B and is considered safe during pregnancy. This medication is excreted in breast milk.
Birth Control Pills Pregnancy And Lactation Text: This medication should be avoided if pregnant and for the first 30 days post-partum.
Glycopyrrolate Pregnancy And Lactation Text: This medication is Pregnancy Category B and is considered safe during pregnancy. It is unknown if it is excreted breast milk.
Valtrex Counseling: I discussed with the patient the risks of valacyclovir including but not limited to kidney damage, nausea, vomiting and severe allergy.  The patient understands that if the infection seems to be worsening or is not improving, they are to call.
Benzoyl Peroxide Pregnancy And Lactation Text: This medication is Pregnancy Category C. It is unknown if benzoyl peroxide is excreted in breast milk.
Minocycline Counseling: Patient advised regarding possible photosensitivity and discoloration of the teeth, skin, lips, tongue and gums.  Patient instructed to avoid sunlight, if possible.  When exposed to sunlight, patients should wear protective clothing, sunglasses, and sunscreen.  The patient was instructed to call the office immediately if the following severe adverse effects occur:  hearing changes, easy bruising/bleeding, severe headache, or vision changes.  The patient verbalized understanding of the proper use and possible adverse effects of minocycline.  All of the patient's questions and concerns were addressed.
Griseofulvin Counseling:  I discussed with the patient the risks of griseofulvin including but not limited to photosensitivity, cytopenia, liver damage, nausea/vomiting and severe allergy.  The patient understands that this medication is best absorbed when taken with a fatty meal (e.g., ice cream or french fries).
Albendazole Counseling:  I discussed with the patient the risks of albendazole including but not limited to cytopenia, kidney damage, nausea/vomiting and severe allergy.  The patient understands that this medication is being used in an off-label manner.
Protopic Counseling: Patient may experience a mild burning sensation during topical application. Protopic is not approved in children less than 2 years of age. There have been case reports of hematologic and skin malignancies in patients using topical calcineurin inhibitors although causality is questionable.
Ivermectin Counseling:  Patient instructed to take medication on an empty stomach with a full glass of water.  Patient informed of potential adverse effects including but not limited to nausea, diarrhea, dizziness, itching, and swelling of the extremities or lymph nodes.  The patient verbalized understanding of the proper use and possible adverse effects of ivermectin.  All of the patient's questions and concerns were addressed.
Simponi Counseling:  I discussed with the patient the risks of golimumab including but not limited to myelosuppression, immunosuppression, autoimmune hepatitis, demyelinating diseases, lymphoma, and serious infections.  The patient understands that monitoring is required including a PPD at baseline and must alert us or the primary physician if symptoms of infection or other concerning signs are noted.
5-Fu Counseling: 5-Fluorouracil Counseling:  I discussed with the patient the risks of 5-fluorouracil including but not limited to erythema, scaling, itching, weeping, crusting, and pain.
Tazorac Counseling:  Patient advised that medication is irritating and drying.  Patient may need to apply sparingly and wash off after an hour before eventually leaving it on overnight.  The patient verbalized understanding of the proper use and possible adverse effects of tazorac.  All of the patient's questions and concerns were addressed.
Erythromycin Counseling:  I discussed with the patient the risks of erythromycin including but not limited to GI upset, allergic reaction, drug rash, diarrhea, increase in liver enzymes, and yeast infections.
SSKI Counseling:  I discussed with the patient the risks of SSKI including but not limited to thyroid abnormalities, metallic taste, GI upset, fever, headache, acne, arthralgias, paraesthesias, lymphadenopathy, easy bleeding, arrhythmias, and allergic reaction.
Dapsone Counseling: I discussed with the patient the risks of dapsone including but not limited to hemolytic anemia, agranulocytosis, rashes, methemoglobinemia, kidney failure, peripheral neuropathy, headaches, GI upset, and liver toxicity.  Patients who start dapsone require monitoring including baseline LFTs and weekly CBCs for the first month, then every month thereafter.  The patient verbalized understanding of the proper use and possible adverse effects of dapsone.  All of the patient's questions and concerns were addressed.
Eucrisa Counseling: Patient may experience a mild burning sensation during topical application. Eucrisa is not approved in children less than 2 years of age.
Solaraze Counseling:  I discussed with the patient the risks of Solaraze including but not limited to erythema, scaling, itching, weeping, crusting, and pain.
Clofazimine Pregnancy And Lactation Text: This medication is Pregnancy Category C and isn't considered safe during pregnancy. It is excreted in breast milk.
Acitretin Counseling:  I discussed with the patient the risks of acitretin including but not limited to hair loss, dry lips/skin/eyes, liver damage, hyperlipidemia, depression/suicidal ideation, photosensitivity.  Serious rare side effects can include but are not limited to pancreatitis, pseudotumor cerebri, bony changes, clot formation/stroke/heart attack.  Patient understands that alcohol is contraindicated since it can result in liver toxicity and significantly prolong the elimination of the drug by many years.
Topical Sulfur Applications Counseling: Topical Sulfur Counseling: Patient counseled that this medication may cause skin irritation or allergic reactions.  In the event of skin irritation, the patient was advised to reduce the amount of the drug applied or use it less frequently.   The patient verbalized understanding of the proper use and possible adverse effects of topical sulfur application.  All of the patient's questions and concerns were addressed.
Azithromycin Counseling:  I discussed with the patient the risks of azithromycin including but not limited to GI upset, allergic reaction, drug rash, diarrhea, and yeast infections.
Clofazimine Counseling:  I discussed with the patient the risks of clofazimine including but not limited to skin and eye pigmentation, liver damage, nausea/vomiting, gastrointestinal bleeding and allergy.
Minoxidil Counseling: Minoxidil is a topical medication which can increase blood flow where it is applied. It is uncertain how this medication increases hair growth. Side effects are uncommon and include stinging and allergic reactions.
Itraconazole Counseling:  I discussed with the patient the risks of itraconazole including but not limited to liver damage, nausea/vomiting, neuropathy, and severe allergy.  The patient understands that this medication is best absorbed when taken with acidic beverages such as non-diet cola or ginger ale.  The patient understands that monitoring is required including baseline LFTs and repeat LFTs at intervals.  The patient understands that they are to contact us or the primary physician if concerning signs are noted.
High Dose Vitamin A Pregnancy And Lactation Text: High dose vitamin A therapy is contraindicated during pregnancy and breast feeding.
Picato Counseling:  I discussed with the patient the risks of Picato including but not limited to erythema, scaling, itching, weeping, crusting, and pain.
Skyrizi Counseling: I discussed with the patient the risks of risankizumab-rzaa including but not limited to immunosuppression, and serious infections.  The patient understands that monitoring is required including a PPD at baseline and must alert us or the primary physician if symptoms of infection or other concerning signs are noted.
Acitretin Pregnancy And Lactation Text: This medication is Pregnancy Category X and should not be given to women who are pregnant or may become pregnant in the future. This medication is excreted in breast milk.
Griseofulvin Pregnancy And Lactation Text: This medication is Pregnancy Category X and is known to cause serious birth defects. It is unknown if this medication is excreted in breast milk but breast feeding should be avoided.
Gabapentin Counseling: I discussed with the patient the risks of gabapentin including but not limited to dizziness, somnolence, fatigue and ataxia.
Minoxidil Pregnancy And Lactation Text: This medication has not been assigned a Pregnancy Risk Category but animal studies failed to show danger with the topical medication. It is unknown if the medication is excreted in breast milk.
Metronidazole Pregnancy And Lactation Text: This medication is Pregnancy Category B and considered safe during pregnancy.  It is also excreted in breast milk.
Hydroxyzine Counseling: Patient advised that the medication is sedating and not to drive a car after taking this medication.  Patient informed of potential adverse effects including but not limited to dry mouth, urinary retention, and blurry vision.  The patient verbalized understanding of the proper use and possible adverse effects of hydroxyzine.  All of the patient's questions and concerns were addressed.
Dapsone Pregnancy And Lactation Text: This medication is Pregnancy Category C and is not considered safe during pregnancy or breast feeding.
Terbinafine Counseling: Patient counseling regarding adverse effects of terbinafine including but not limited to headache, diarrhea, rash, upset stomach, liver function test abnormalities, itching, taste/smell disturbance, nausea, abdominal pain, and flatulence.  There is a rare possibility of liver failure that can occur when taking terbinafine.  The patient understands that a baseline LFT and kidney function test may be required. The patient verbalized understanding of the proper use and possible adverse effects of terbinafine.  All of the patient's questions and concerns were addressed.
Cyclosporine Counseling:  I discussed with the patient the risks of cyclosporine including but not limited to hypertension, gingival hyperplasia,myelosuppression, immunosuppression, liver damage, kidney damage, neurotoxicity, lymphoma, and serious infections. The patient understands that monitoring is required including baseline blood pressure, CBC, CMP, lipid panel and uric acid, and then 1-2 times monthly CMP and blood pressure.
Isotretinoin Counseling: Patient should get monthly blood tests, not donate blood, not drive at night if vision affected, not share medication, and not undergo elective surgery for 6 months after tx completed. Side effects reviewed, pt to contact office should one occur.
Doxycycline Counseling:  Patient counseled regarding possible photosensitivity and increased risk for sunburn.  Patient instructed to avoid sunlight, if possible.  When exposed to sunlight, patients should wear protective clothing, sunglasses, and sunscreen.  The patient was instructed to call the office immediately if the following severe adverse effects occur:  hearing changes, easy bruising/bleeding, severe headache, or vision changes.  The patient verbalized understanding of the proper use and possible adverse effects of doxycycline.  All of the patient's questions and concerns were addressed.
Topical Sulfur Applications Pregnancy And Lactation Text: This medication is Pregnancy Category C and has an unknown safety profile during pregnancy. It is unknown if this topical medication is excreted in breast milk.
Cyclophosphamide Pregnancy And Lactation Text: This medication is Pregnancy Category D and it isn't considered safe during pregnancy. This medication is excreted in breast milk.
Azathioprine Pregnancy And Lactation Text: This medication is Pregnancy Category D and isn't considered safe during pregnancy. It is unknown if this medication is excreted in breast milk.
Colchicine Counseling:  Patient counseled regarding adverse effects including but not limited to stomach upset (nausea, vomiting, stomach pain, or diarrhea).  Patient instructed to limit alcohol consumption while taking this medication.  Colchicine may reduce blood counts especially with prolonged use.  The patient understands that monitoring of kidney function and blood counts may be required, especially at baseline. The patient verbalized understanding of the proper use and possible adverse effects of colchicine.  All of the patient's questions and concerns were addressed.
Detail Level: Detailed
Ketoconazole Counseling:   Patient counseled regarding improving absorption with orange juice.  Adverse effects include but are not limited to breast enlargement, headache, diarrhea, nausea, upset stomach, liver function test abnormalities, taste disturbance, and stomach pain.  There is a rare possibility of liver failure that can occur when taking ketoconazole. The patient understands that monitoring of LFTs may be required, especially at baseline. The patient verbalized understanding of the proper use and possible adverse effects of ketoconazole.  All of the patient's questions and concerns were addressed.
Birth Control Pills Counseling: Birth Control Pill Counseling: I discussed with the patient the potential side effects of OCPs including but not limited to increased risk of stroke, heart attack, thrombophlebitis, deep venous thrombosis, hepatic adenomas, breast changes, GI upset, headaches, and depression.  The patient verbalized understanding of the proper use and possible adverse effects of OCPs. All of the patient's questions and concerns were addressed.
Rifampin Counseling: I discussed with the patient the risks of rifampin including but not limited to liver damage, kidney damage, red-orange body fluids, nausea/vomiting and severe allergy.
Cephalexin Counseling: I counseled the patient regarding use of cephalexin as an antibiotic for prophylactic and/or therapeutic purposes. Cephalexin (commonly prescribed under brand name Keflex) is a cephalosporin antibiotic which is active against numerous classes of bacteria, including most skin bacteria. Side effects may include nausea, diarrhea, gastrointestinal upset, rash, hives, yeast infections, and in rare cases, hepatitis, kidney disease, seizures, fever, confusion, neurologic symptoms, and others. Patients with severe allergies to penicillin medications are cautioned that there is about a 10% incidence of cross-reactivity with cephalosporins. When possible, patients with penicillin allergies should use alternatives to cephalosporins for antibiotic therapy.
Drysol Pregnancy And Lactation Text: This medication is considered safe during pregnancy and breast feeding.
Ilumya Counseling: I discussed with the patient the risks of tildrakizumab including but not limited to immunosuppression, malignancy, posterior leukoencephalopathy syndrome, and serious infections.  The patient understands that monitoring is required including a PPD at baseline and must alert us or the primary physician if symptoms of infection or other concerning signs are noted.
Cellcept Counseling:  I discussed with the patient the risks of mycophenolate mofetil including but not limited to infection/immunosuppression, GI upset, hypokalemia, hypercholesterolemia, bone marrow suppression, lymphoproliferative disorders, malignancy, GI ulceration/bleed/perforation, colitis, interstitial lung disease, kidney failure, progressive multifocal leukoencephalopathy, and birth defects.  The patient understands that monitoring is required including a baseline creatinine and regular CBC testing. In addition, patient must alert us immediately if symptoms of infection or other concerning signs are noted.
High Dose Vitamin A Counseling: Side effects reviewed, pt to contact office should one occur.
Xeljanz Counseling: I discussed with the patient the risks of Xeljanz therapy including increased risk of infection, liver issues, headache, diarrhea, or cold symptoms. Live vaccines should be avoided. They were instructed to call if they have any problems.
Cimzia Pregnancy And Lactation Text: This medication crosses the placenta but can be considered safe in certain situations. Cimzia may be excreted in breast milk.
Nsaids Pregnancy And Lactation Text: These medications are considered safe up to 30 weeks gestation. It is excreted in breast milk.
Bexarotene Pregnancy And Lactation Text: This medication is Pregnancy Category X and should not be given to women who are pregnant or may become pregnant. This medication should not be used if you are breast feeding.
Tremfya Counseling: I discussed with the patient the risks of guselkumab including but not limited to immunosuppression, serious infections, worsening of inflammatory bowel disease and drug reactions.  The patient understands that monitoring is required including a PPD at baseline and must alert us or the primary physician if symptoms of infection or other concerning signs are noted.
Hydroxychloroquine Pregnancy And Lactation Text: This medication has been shown to cause fetal harm but it isn't assigned a Pregnancy Risk Category. There are small amounts excreted in breast milk.
Rituxan Counseling:  I discussed with the patient the risks of Rituxan infusions. Side effects can include infusion reactions, severe drug rashes including mucocutaneous reactions, reactivation of latent hepatitis and other infections and rarely progressive multifocal leukoencephalopathy.  All of the patient's questions and concerns were addressed.

## 2020-07-21 NOTE — PROCEDURE: COUNSELING
Topical Clindamycin Pregnancy And Lactation Text: This medication is Pregnancy Category B and is considered safe during pregnancy. It is unknown if it is excreted in breast milk.
Minocycline Pregnancy And Lactation Text: This medication is Pregnancy Category D and not consider safe during pregnancy. It is also excreted in breast milk.
Tazorac Pregnancy And Lactation Text: This medication is not safe during pregnancy. It is unknown if this medication is excreted in breast milk.
Doxycycline Pregnancy And Lactation Text: This medication is Pregnancy Category D and not consider safe during pregnancy. It is also excreted in breast milk but is considered safe for shorter treatment courses.
High Dose Vitamin A Counseling: Side effects reviewed, pt to contact office should one occur.
Topical Retinoid counseling:  Patient advised to apply a pea-sized amount only at bedtime and wait 30 minutes after washing their face before applying.  If too drying, patient may add a non-comedogenic moisturizer. The patient verbalized understanding of the proper use and possible adverse effects of retinoids.  All of the patient's questions and concerns were addressed.
Topical Sulfur Applications Pregnancy And Lactation Text: This medication is Pregnancy Category C and has an unknown safety profile during pregnancy. It is unknown if this topical medication is excreted in breast milk.
Erythromycin Pregnancy And Lactation Text: This medication is Pregnancy Category B and is considered safe during pregnancy. It is also excreted in breast milk.
Azithromycin Pregnancy And Lactation Text: This medication is considered safe during pregnancy and is also secreted in breast milk.
Doxycycline Counseling:  Patient counseled regarding possible photosensitivity and increased risk for sunburn.  Patient instructed to avoid sunlight, if possible.  When exposed to sunlight, patients should wear protective clothing, sunglasses, and sunscreen.  The patient was instructed to call the office immediately if the following severe adverse effects occur:  hearing changes, easy bruising/bleeding, severe headache, or vision changes.  The patient verbalized understanding of the proper use and possible adverse effects of doxycycline.  All of the patient's questions and concerns were addressed.
Spironolactone Pregnancy And Lactation Text: This medication can cause feminization of the male fetus and should be avoided during pregnancy. The active metabolite is also found in breast milk.
Detail Level: Detailed
Include Pregnancy/Lactation Warning?: No
Erythromycin Counseling:  I discussed with the patient the risks of erythromycin including but not limited to GI upset, allergic reaction, drug rash, diarrhea, increase in liver enzymes, and yeast infections.
Sarecycline Counseling: Patient advised regarding possible photosensitivity and discoloration of the teeth, skin, lips, tongue and gums.  Patient instructed to avoid sunlight, if possible.  When exposed to sunlight, patients should wear protective clothing, sunglasses, and sunscreen.  The patient was instructed to call the office immediately if the following severe adverse effects occur:  hearing changes, easy bruising/bleeding, severe headache, or vision changes.  The patient verbalized understanding of the proper use and possible adverse effects of sarecycline.  All of the patient's questions and concerns were addressed.
Isotretinoin Counseling: Patient should get monthly blood tests, not donate blood, not drive at night if vision affected, not share medication, and not undergo elective surgery for 6 months after tx completed. Side effects reviewed, pt to contact office should one occur.
Dapsone Counseling: I discussed with the patient the risks of dapsone including but not limited to hemolytic anemia, agranulocytosis, rashes, methemoglobinemia, kidney failure, peripheral neuropathy, headaches, GI upset, and liver toxicity.  Patients who start dapsone require monitoring including baseline LFTs and weekly CBCs for the first month, then every month thereafter.  The patient verbalized understanding of the proper use and possible adverse effects of dapsone.  All of the patient's questions and concerns were addressed.
Tazorac Counseling:  Patient advised that medication is irritating and drying.  Patient may need to apply sparingly and wash off after an hour before eventually leaving it on overnight.  The patient verbalized understanding of the proper use and possible adverse effects of tazorac.  All of the patient's questions and concerns were addressed.
Birth Control Pills Pregnancy And Lactation Text: This medication should be avoided if pregnant and for the first 30 days post-partum.
Isotretinoin Pregnancy And Lactation Text: This medication is Pregnancy Category X and is considered extremely dangerous during pregnancy. It is unknown if it is excreted in breast milk.
Bactrim Pregnancy And Lactation Text: This medication is Pregnancy Category D and is known to cause fetal risk.  It is also excreted in breast milk.
Dapsone Pregnancy And Lactation Text: This medication is Pregnancy Category C and is not considered safe during pregnancy or breast feeding.
Topical Retinoid Pregnancy And Lactation Text: This medication is Pregnancy Category C. It is unknown if this medication is excreted in breast milk.
Topical Clindamycin Counseling: Patient counseled that this medication may cause skin irritation or allergic reactions.  In the event of skin irritation, the patient was advised to reduce the amount of the drug applied or use it less frequently.   The patient verbalized understanding of the proper use and possible adverse effects of clindamycin.  All of the patient's questions and concerns were addressed.
Benzoyl Peroxide Pregnancy And Lactation Text: This medication is Pregnancy Category C. It is unknown if benzoyl peroxide is excreted in breast milk.
Topical Sulfur Applications Counseling: Topical Sulfur Counseling: Patient counseled that this medication may cause skin irritation or allergic reactions.  In the event of skin irritation, the patient was advised to reduce the amount of the drug applied or use it less frequently.   The patient verbalized understanding of the proper use and possible adverse effects of topical sulfur application.  All of the patient's questions and concerns were addressed.
High Dose Vitamin A Pregnancy And Lactation Text: High dose vitamin A therapy is contraindicated during pregnancy and breast feeding.
Minocycline Counseling: Patient advised regarding possible photosensitivity and discoloration of the teeth, skin, lips, tongue and gums.  Patient instructed to avoid sunlight, if possible.  When exposed to sunlight, patients should wear protective clothing, sunglasses, and sunscreen.  The patient was instructed to call the office immediately if the following severe adverse effects occur:  hearing changes, easy bruising/bleeding, severe headache, or vision changes.  The patient verbalized understanding of the proper use and possible adverse effects of minocycline.  All of the patient's questions and concerns were addressed.
Spironolactone Counseling: Patient advised regarding risks of diarrhea, abdominal pain, hyperkalemia, birth defects (for female patients), liver toxicity and renal toxicity. The patient may need blood work to monitor liver and kidney function and potassium levels while on therapy. The patient verbalized understanding of the proper use and possible adverse effects of spironolactone.  All of the patient's questions and concerns were addressed.
Tetracycline Counseling: Patient counseled regarding possible photosensitivity and increased risk for sunburn.  Patient instructed to avoid sunlight, if possible.  When exposed to sunlight, patients should wear protective clothing, sunglasses, and sunscreen.  The patient was instructed to call the office immediately if the following severe adverse effects occur:  hearing changes, easy bruising/bleeding, severe headache, or vision changes.  The patient verbalized understanding of the proper use and possible adverse effects of tetracycline.  All of the patient's questions and concerns were addressed. Patient understands to avoid pregnancy while on therapy due to potential birth defects.
Benzoyl Peroxide Counseling: Patient counseled that medicine may cause skin irritation and bleach clothing.  In the event of skin irritation, the patient was advised to reduce the amount of the drug applied or use it less frequently.   The patient verbalized understanding of the proper use and possible adverse effects of benzoyl peroxide.  All of the patient's questions and concerns were addressed.
Birth Control Pills Counseling: Birth Control Pill Counseling: I discussed with the patient the potential side effects of OCPs including but not limited to increased risk of stroke, heart attack, thrombophlebitis, deep venous thrombosis, hepatic adenomas, breast changes, GI upset, headaches, and depression.  The patient verbalized understanding of the proper use and possible adverse effects of OCPs. All of the patient's questions and concerns were addressed.
Azithromycin Counseling:  I discussed with the patient the risks of azithromycin including but not limited to GI upset, allergic reaction, drug rash, diarrhea, and yeast infections.
Bactrim Counseling:  I discussed with the patient the risks of sulfa antibiotics including but not limited to GI upset, allergic reaction, drug rash, diarrhea, dizziness, photosensitivity, and yeast infections.  Rarely, more serious reactions can occur including but not limited to aplastic anemia, agranulocytosis, methemoglobinemia, blood dyscrasias, liver or kidney failure, lung infiltrates or desquamative/blistering drug rashes.

## 2020-08-20 ENCOUNTER — APPOINTMENT (RX ONLY)
Dept: URBAN - METROPOLITAN AREA CLINIC 148 | Facility: CLINIC | Age: 20
Setting detail: DERMATOLOGY
End: 2020-08-20

## 2020-08-20 DIAGNOSIS — Z79.899 OTHER LONG TERM (CURRENT) DRUG THERAPY: ICD-10-CM

## 2020-08-20 DIAGNOSIS — L70.0 ACNE VULGARIS: ICD-10-CM

## 2020-08-20 PROCEDURE — ? PRESCRIPTION

## 2020-08-20 PROCEDURE — ? URINE PREGNANCY TEST

## 2020-08-20 PROCEDURE — 81025 URINE PREGNANCY TEST: CPT

## 2020-08-20 PROCEDURE — 99214 OFFICE O/P EST MOD 30 MIN: CPT

## 2020-08-20 PROCEDURE — ? ISOTRETINOIN MONITORING

## 2020-08-20 PROCEDURE — ? ORDER TESTS

## 2020-08-20 PROCEDURE — ? COUNSELING

## 2020-08-20 RX ORDER — ISOTRETINOIN 30 MG/1
CAPSULE, LIQUID FILLED ORAL
Qty: 60 | Refills: 0 | Status: ERX

## 2020-08-20 NOTE — PROCEDURE: URINE PREGNANCY TEST
Expiration Date (Optional): 2021-10-31
Urine Pregnancy Test Result: negative
Performed By: Yumiko
Lot # (Optional): [id number]
Detail Level: None

## 2020-08-20 NOTE — PROCEDURE: HIGH RISK MEDICATION MONITORING
Ilumya Pregnancy And Lactation Text: The risk during pregnancy and breastfeeding is uncertain with this medication.
Rituxan Counseling:  I discussed with the patient the risks of Rituxan infusions. Side effects can include infusion reactions, severe drug rashes including mucocutaneous reactions, reactivation of latent hepatitis and other infections and rarely progressive multifocal leukoencephalopathy.  All of the patient's questions and concerns were addressed.
Cyclosporine Pregnancy And Lactation Text: This medication is Pregnancy Category C and it isn't know if it is safe during pregnancy. This medication is excreted in breast milk.
Gabapentin Pregnancy And Lactation Text: This medication is Pregnancy Category C and isn't considered safe during pregnancy. It is excreted in breast milk.
Birth Control Pills Counseling: Birth Control Pill Counseling: I discussed with the patient the potential side effects of OCPs including but not limited to increased risk of stroke, heart attack, thrombophlebitis, deep venous thrombosis, hepatic adenomas, breast changes, GI upset, headaches, and depression.  The patient verbalized understanding of the proper use and possible adverse effects of OCPs. All of the patient's questions and concerns were addressed.
Drysol Pregnancy And Lactation Text: This medication is considered safe during pregnancy and breast feeding.
Cephalexin Counseling: I counseled the patient regarding use of cephalexin as an antibiotic for prophylactic and/or therapeutic purposes. Cephalexin (commonly prescribed under brand name Keflex) is a cephalosporin antibiotic which is active against numerous classes of bacteria, including most skin bacteria. Side effects may include nausea, diarrhea, gastrointestinal upset, rash, hives, yeast infections, and in rare cases, hepatitis, kidney disease, seizures, fever, confusion, neurologic symptoms, and others. Patients with severe allergies to penicillin medications are cautioned that there is about a 10% incidence of cross-reactivity with cephalosporins. When possible, patients with penicillin allergies should use alternatives to cephalosporins for antibiotic therapy.
Topical Clindamycin Pregnancy And Lactation Text: This medication is Pregnancy Category B and is considered safe during pregnancy. It is unknown if it is excreted in breast milk.
Rifampin Counseling: I discussed with the patient the risks of rifampin including but not limited to liver damage, kidney damage, red-orange body fluids, nausea/vomiting and severe allergy.
Cellcept Counseling:  I discussed with the patient the risks of mycophenolate mofetil including but not limited to infection/immunosuppression, GI upset, hypokalemia, hypercholesterolemia, bone marrow suppression, lymphoproliferative disorders, malignancy, GI ulceration/bleed/perforation, colitis, interstitial lung disease, kidney failure, progressive multifocal leukoencephalopathy, and birth defects.  The patient understands that monitoring is required including a baseline creatinine and regular CBC testing. In addition, patient must alert us immediately if symptoms of infection or other concerning signs are noted.
Methotrexate Pregnancy And Lactation Text: This medication is Pregnancy Category X and is known to cause fetal harm. This medication is excreted in breast milk.
Stelara Pregnancy And Lactation Text: This medication is Pregnancy Category B and is considered safe during pregnancy. It is unknown if this medication is excreted in breast milk.
Hydroxychloroquine Pregnancy And Lactation Text: This medication has been shown to cause fetal harm but it isn't assigned a Pregnancy Risk Category. There are small amounts excreted in breast milk.
Arava Pregnancy And Lactation Text: This medication is Pregnancy Category X and is absolutely contraindicated during pregnancy. It is unknown if it is excreted in breast milk.
Use Enhanced Medication Counseling?: No
Dupixent Counseling: I discussed with the patient the risks of dupilumab including but not limited to eye infection and irritation, cold sores, injection site reactions, worsening of asthma, allergic reactions and increased risk of parasitic infection.  Live vaccines should be avoided while taking dupilumab. Dupilumab will also interact with certain medications such as warfarin and cyclosporine. The patient understands that monitoring is required and they must alert us or the primary physician if symptoms of infection or other concerning signs are noted.
High Dose Vitamin A Counseling: Side effects reviewed, pt to contact office should one occur.
Cimzia Pregnancy And Lactation Text: This medication crosses the placenta but can be considered safe in certain situations. Cimzia may be excreted in breast milk.
Infliximab Counseling:  I discussed with the patient the risks of infliximab including but not limited to myelosuppression, immunosuppression, autoimmune hepatitis, demyelinating diseases, lymphoma, and serious infections.  The patient understands that monitoring is required including a PPD at baseline and must alert us or the primary physician if symptoms of infection or other concerning signs are noted.
Carac Pregnancy And Lactation Text: This medication is Pregnancy Category X and contraindicated in pregnancy and in women who may become pregnant. It is unknown if this medication is excreted in breast milk.
Quinolones Counseling:  I discussed with the patient the risks of fluoroquinolones including but not limited to GI upset, allergic reaction, drug rash, diarrhea, dizziness, photosensitivity, yeast infections, liver function test abnormalities, tendonitis/tendon rupture.
Bexarotene Pregnancy And Lactation Text: This medication is Pregnancy Category X and should not be given to women who are pregnant or may become pregnant. This medication should not be used if you are breast feeding.
Tremfya Counseling: I discussed with the patient the risks of guselkumab including but not limited to immunosuppression, serious infections, worsening of inflammatory bowel disease and drug reactions.  The patient understands that monitoring is required including a PPD at baseline and must alert us or the primary physician if symptoms of infection or other concerning signs are noted.
Topical Retinoid Pregnancy And Lactation Text: This medication is Pregnancy Category C. It is unknown if this medication is excreted in breast milk.
Tazorac Counseling:  Patient advised that medication is irritating and drying.  Patient may need to apply sparingly and wash off after an hour before eventually leaving it on overnight.  The patient verbalized understanding of the proper use and possible adverse effects of tazorac.  All of the patient's questions and concerns were addressed.
Clindamycin Counseling: I counseled the patient regarding use of clindamycin as an antibiotic for prophylactic and/or therapeutic purposes. Clindamycin is active against numerous classes of bacteria, including skin bacteria. Side effects may include nausea, diarrhea, gastrointestinal upset, rash, hives, yeast infections, and in rare cases, colitis.
Simponi Counseling:  I discussed with the patient the risks of golimumab including but not limited to myelosuppression, immunosuppression, autoimmune hepatitis, demyelinating diseases, lymphoma, and serious infections.  The patient understands that monitoring is required including a PPD at baseline and must alert us or the primary physician if symptoms of infection or other concerning signs are noted.
Minoxidil Pregnancy And Lactation Text: This medication has not been assigned a Pregnancy Risk Category but animal studies failed to show danger with the topical medication. It is unknown if the medication is excreted in breast milk.
Cosentyx Counseling:  I discussed with the patient the risks of Cosentyx including but not limited to worsening of Crohn's disease, immunosuppression, allergic reactions and infections.  The patient understands that monitoring is required including a PPD at baseline and must alert us or the primary physician if symptoms of infection or other concerning signs are noted.
Isotretinoin Pregnancy And Lactation Text: This medication is Pregnancy Category X and is considered extremely dangerous during pregnancy. It is unknown if it is excreted in breast milk.
Zyclara Counseling:  I discussed with the patient the risks of imiquimod including but not limited to erythema, scaling, itching, weeping, crusting, and pain.  Patient understands that the inflammatory response to imiquimod is variable from person to person and was educated regarded proper titration schedule.  If flu-like symptoms develop, patient knows to discontinue the medication and contact us.
Arava Counseling:  Patient counseled regarding adverse effects of Arava including but not limited to nausea, vomiting, abnormalities in liver function tests. Patients may develop mouth sores, rash, diarrhea, and abnormalities in blood counts. The patient understands that monitoring is required including LFTs and blood counts.  There is a rare possibility of scarring of the liver and lung problems that can occur when taking methotrexate. Persistent nausea, loss of appetite, pale stools, dark urine, cough, and shortness of breath should be reported immediately. Patient advised to discontinue Arava treatment and consult with a physician prior to attempting conception. The patient will have to undergo a treatment to eliminate Arava from the body prior to conception.
Doxepin Pregnancy And Lactation Text: This medication is Pregnancy Category C and it isn't known if it is safe during pregnancy. It is also excreted in breast milk and breast feeding isn't recommended.
Thalidomide Counseling: I discussed with the patient the risks of thalidomide including but not limited to birth defects, anxiety, weakness, chest pain, dizziness, cough and severe allergy.
Cimetidine Counseling:  I discussed with the patient the risks of Cimetidine including but not limited to gynecomastia, headache, diarrhea, nausea, drowsiness, arrhythmias, pancreatitis, skin rashes, psychosis, bone marrow suppression and kidney toxicity.
Tetracycline Counseling: Patient counseled regarding possible photosensitivity and increased risk for sunburn.  Patient instructed to avoid sunlight, if possible.  When exposed to sunlight, patients should wear protective clothing, sunglasses, and sunscreen.  The patient was instructed to call the office immediately if the following severe adverse effects occur:  hearing changes, easy bruising/bleeding, severe headache, or vision changes.  The patient verbalized understanding of the proper use and possible adverse effects of tetracycline.  All of the patient's questions and concerns were addressed. Patient understands to avoid pregnancy while on therapy due to potential birth defects.
Spironolactone Counseling: Patient advised regarding risks of diarrhea, abdominal pain, hyperkalemia, birth defects (for female patients), liver toxicity and renal toxicity. The patient may need blood work to monitor liver and kidney function and potassium levels while on therapy. The patient verbalized understanding of the proper use and possible adverse effects of spironolactone.  All of the patient's questions and concerns were addressed.
Cephalexin Pregnancy And Lactation Text: This medication is Pregnancy Category B and considered safe during pregnancy.  It is also excreted in breast milk but can be used safely for shorter doses.
Cimetidine Pregnancy And Lactation Text: This medication is Pregnancy Category B and is considered safe during pregnancy. It is also excreted in breast milk and breast feeding isn't recommended.
Metronidazole Counseling:  I discussed with the patient the risks of metronidazole including but not limited to seizures, nausea/vomiting, a metallic taste in the mouth, nausea/vomiting and severe allergy.
Protopic Pregnancy And Lactation Text: This medication is Pregnancy Category C. It is unknown if this medication is excreted in breast milk when applied topically.
Fluconazole Pregnancy And Lactation Text: This medication is Pregnancy Category C and it isn't know if it is safe during pregnancy. It is also excreted in breast milk.
Nsaids Counseling: NSAID Counseling: I discussed with the patient that NSAIDs should be taken with food. Prolonged use of NSAIDs can result in the development of stomach ulcers.  Patient advised to stop taking NSAIDs if abdominal pain occurs.  The patient verbalized understanding of the proper use and possible adverse effects of NSAIDs.  All of the patient's questions and concerns were addressed.
Hydroxychloroquine Counseling:  I discussed with the patient that a baseline ophthalmologic exam is needed at the start of therapy and every year thereafter while on therapy. A CBC may also be warranted for monitoring.  The side effects of this medication were discussed with the patient, including but not limited to agranulocytosis, aplastic anemia, seizures, rashes, retinopathy, and liver toxicity. Patient instructed to call the office should any adverse effect occur.  The patient verbalized understanding of the proper use and possible adverse effects of Plaquenil.  All the patient's questions and concerns were addressed.
Enbrel Counseling:  I discussed with the patient the risks of etanercept including but not limited to myelosuppression, immunosuppression, autoimmune hepatitis, demyelinating diseases, lymphoma, and infections.  The patient understands that monitoring is required including a PPD at baseline and must alert us or the primary physician if symptoms of infection or other concerning signs are noted.
Xeljonathanz Pregnancy And Lactation Text: This medication is Pregnancy Category D and is not considered safe during pregnancy.  The risk during breast feeding is also uncertain.
Topical Clindamycin Counseling: Patient counseled that this medication may cause skin irritation or allergic reactions.  In the event of skin irritation, the patient was advised to reduce the amount of the drug applied or use it less frequently.   The patient verbalized understanding of the proper use and possible adverse effects of clindamycin.  All of the patient's questions and concerns were addressed.
Drysol Counseling:  I discussed with the patient the risks of drysol/aluminum chloride including but not limited to skin rash, itching, irritation, burning.
Otezla Pregnancy And Lactation Text: This medication is Pregnancy Category C and it isn't known if it is safe during pregnancy. It is unknown if it is excreted in breast milk.
Elidel Counseling: Patient may experience a mild burning sensation during topical application. Elidel is not approved in children less than 2 years of age. There have been case reports of hematologic and skin malignancies in patients using topical calcineurin inhibitors although causality is questionable.
Tetracycline Pregnancy And Lactation Text: This medication is Pregnancy Category D and not consider safe during pregnancy. It is also excreted in breast milk.
Nsaids Pregnancy And Lactation Text: These medications are considered safe up to 30 weeks gestation. It is excreted in breast milk.
Ketoconazole Pregnancy And Lactation Text: This medication is Pregnancy Category C and it isn't know if it is safe during pregnancy. It is also excreted in breast milk and breast feeding isn't recommended.
Xeljanz Counseling: I discussed with the patient the risks of Xeljanz therapy including increased risk of infection, liver issues, headache, diarrhea, or cold symptoms. Live vaccines should be avoided. They were instructed to call if they have any problems.
Albendazole Pregnancy And Lactation Text: This medication is Pregnancy Category C and it isn't known if it is safe during pregnancy. It is also excreted in breast milk.
Otezla Counseling: The side effects of Otezla were discussed with the patient, including but not limited to worsening or new depression, weight loss, diarrhea, nausea, upper respiratory tract infection, and headache. Patient instructed to call the office should any adverse effect occur.  The patient verbalized understanding of the proper use and possible adverse effects of Otezla.  All the patient's questions and concerns were addressed.
Azathioprine Counseling:  I discussed with the patient the risks of azathioprine including but not limited to myelosuppression, immunosuppression, hepatotoxicity, lymphoma, and infections.  The patient understands that monitoring is required including baseline LFTs, Creatinine, possible TPMP genotyping and weekly CBCs for the first month and then every 2 weeks thereafter.  The patient verbalized understanding of the proper use and possible adverse effects of azathioprine.  All of the patient's questions and concerns were addressed.
Dupixent Pregnancy And Lactation Text: This medication likely crosses the placenta but the risk for the fetus is uncertain. This medication is excreted in breast milk.
Siliq Counseling:  I discussed with the patient the risks of Siliq including but not limited to new or worsening depression, suicidal thoughts and behavior, immunosuppression, malignancy, posterior leukoencephalopathy syndrome, and serious infections.  The patient understands that monitoring is required including a PPD at baseline and must alert us or the primary physician if symptoms of infection or other concerning signs are noted. There is also a special program designed to monitor depression which is required with Siliq.
Cellcept Pregnancy And Lactation Text: This medication is Pregnancy Category D and isn't considered safe during pregnancy. It is unknown if this medication is excreted in breast milk.
Cyclophosphamide Counseling:  I discussed with the patient the risks of cyclophosphamide including but not limited to hair loss, hormonal abnormalities, decreased fertility, abdominal pain, diarrhea, nausea and vomiting, bone marrow suppression and infection. The patient understands that monitoring is required while taking this medication.
Imiquimod Counseling:  I discussed with the patient the risks of imiquimod including but not limited to erythema, scaling, itching, weeping, crusting, and pain.  Patient understands that the inflammatory response to imiquimod is variable from person to person and was educated regarded proper titration schedule.  If flu-like symptoms develop, patient knows to discontinue the medication and contact us.
Taltz Counseling: I discussed with the patient the risks of ixekizumab including but not limited to immunosuppression, serious infections, worsening of inflammatory bowel disease and drug reactions.  The patient understands that monitoring is required including a PPD at baseline and must alert us or the primary physician if symptoms of infection or other concerning signs are noted.
Bactrim Counseling:  I discussed with the patient the risks of sulfa antibiotics including but not limited to GI upset, allergic reaction, drug rash, diarrhea, dizziness, photosensitivity, and yeast infections.  Rarely, more serious reactions can occur including but not limited to aplastic anemia, agranulocytosis, methemoglobinemia, blood dyscrasias, liver or kidney failure, lung infiltrates or desquamative/blistering drug rashes.
Topical Retinoid counseling:  Patient advised to apply a pea-sized amount only at bedtime and wait 30 minutes after washing their face before applying.  If too drying, patient may add a non-comedogenic moisturizer. The patient verbalized understanding of the proper use and possible adverse effects of retinoids.  All of the patient's questions and concerns were addressed.
Rituxan Pregnancy And Lactation Text: This medication is Pregnancy Category C and it isn't know if it is safe during pregnancy. It is unknown if this medication is excreted in breast milk but similar antibodies are known to be excreted.
Xolair Counseling:  Patient informed of potential adverse effects including but not limited to fever, muscle aches, rash and allergic reactions.  The patient verbalized understanding of the proper use and possible adverse effects of Xolair.  All of the patient's questions and concerns were addressed.
Stelara Counseling:  I discussed with the patient the risks of ustekinumab including but not limited to immunosuppression, malignancy, posterior leukoencephalopathy syndrome, and serious infections.  The patient understands that monitoring is required including a PPD at baseline and must alert us or the primary physician if symptoms of infection or other concerning signs are noted.
Glycopyrrolate Counseling:  I discussed with the patient the risks of glycopyrrolate including but not limited to skin rash, drowsiness, dry mouth, difficulty urinating, and blurred vision.
Erivedge Counseling- I discussed with the patient the risks of Erivedge including but not limited to nausea, vomiting, diarrhea, constipation, weight loss, changes in the sense of taste, decreased appetite, muscle spasms, and hair loss.  The patient verbalized understanding of the proper use and possible adverse effects of Erivedge.  All of the patient's questions and concerns were addressed.
Bexarotene Counseling:  I discussed with the patient the risks of bexarotene including but not limited to hair loss, dry lips/skin/eyes, liver abnormalities, hyperlipidemia, pancreatitis, depression/suicidal ideation, photosensitivity, drug rash/allergic reactions, hypothyroidism, anemia, leukopenia, infection, cataracts, and teratogenicity.  Patient understands that they will need regular blood tests to check lipid profile, liver function tests, white blood cell count, thyroid function tests and pregnancy test if applicable.
Carac Counseling:  I discussed with the patient the risks of Carac including but not limited to erythema, scaling, itching, weeping, crusting, and pain.
Erythromycin Pregnancy And Lactation Text: This medication is Pregnancy Category B and is considered safe during pregnancy. It is also excreted in breast milk.
Methotrexate Counseling:  Patient counseled regarding adverse effects of methotrexate including but not limited to nausea, vomiting, abnormalities in liver function tests. Patients may develop mouth sores, rash, diarrhea, and abnormalities in blood counts. The patient understands that monitoring is required including LFT's and blood counts.  There is a rare possibility of scarring of the liver and lung problems that can occur when taking methotrexate. Persistent nausea, loss of appetite, pale stools, dark urine, cough, and shortness of breath should be reported immediately. Patient advised to discontinue methotrexate treatment at least three months before attempting to become pregnant.  I discussed the need for folate supplements while taking methotrexate.  These supplements can decrease side effects during methotrexate treatment. The patient verbalized understanding of the proper use and possible adverse effects of methotrexate.  All of the patient's questions and concerns were addressed.
Doxycycline Pregnancy And Lactation Text: This medication is Pregnancy Category D and not consider safe during pregnancy. It is also excreted in breast milk but is considered safe for shorter treatment courses.
Odomzo Counseling- I discussed with the patient the risks of Odomzo including but not limited to nausea, vomiting, diarrhea, constipation, weight loss, changes in the sense of taste, decreased appetite, muscle spasms, and hair loss.  The patient verbalized understanding of the proper use and possible adverse effects of Odomzo.  All of the patient's questions and concerns were addressed.
Fluconazole Counseling:  Patient counseled regarding adverse effects of fluconazole including but not limited to headache, diarrhea, nausea, upset stomach, liver function test abnormalities, taste disturbance, and stomach pain.  There is a rare possibility of liver failure that can occur when taking fluconazole.  The patient understands that monitoring of LFTs and kidney function test may be required, especially at baseline. The patient verbalized understanding of the proper use and possible adverse effects of fluconazole.  All of the patient's questions and concerns were addressed.
Oxybutynin Counseling:  I discussed with the patient the risks of oxybutynin including but not limited to skin rash, drowsiness, dry mouth, difficulty urinating, and blurred vision.
Valtrex Pregnancy And Lactation Text: this medication is Pregnancy Category B and is considered safe during pregnancy. This medication is not directly found in breast milk but it's metabolite acyclovir is present.
Solaraze Pregnancy And Lactation Text: This medication is Pregnancy Category B and is considered safe. There is some data to suggest avoiding during the third trimester. It is unknown if this medication is excreted in breast milk.
Sski Pregnancy And Lactation Text: This medication is Pregnancy Category D and isn't considered safe during pregnancy. It is excreted in breast milk.
Tazorac Pregnancy And Lactation Text: This medication is not safe during pregnancy. It is unknown if this medication is excreted in breast milk.
Valtrex Counseling: I discussed with the patient the risks of valacyclovir including but not limited to kidney damage, nausea, vomiting and severe allergy.  The patient understands that if the infection seems to be worsening or is not improving, they are to call.
Bactrim Pregnancy And Lactation Text: This medication is Pregnancy Category D and is known to cause fetal risk.  It is also excreted in breast milk.
Prednisone Counseling:  I discussed with the patient the risks of prolonged use of prednisone including but not limited to weight gain, insomnia, osteoporosis, mood changes, diabetes, susceptibility to infection, glaucoma and high blood pressure.  In cases where prednisone use is prolonged, patients should be monitored with blood pressure checks, serum glucose levels and an eye exam.  Additionally, the patient may need to be placed on GI prophylaxis, PCP prophylaxis, and calcium and vitamin D supplementation and/or a bisphosphonate.  The patient verbalized understanding of the proper use and the possible adverse effects of prednisone.  All of the patient's questions and concerns were addressed.
Hydroquinone Counseling:  Patient advised that medication may result in skin irritation, lightening (hypopigmentation), dryness, and burning.  In the event of skin irritation, the patient was advised to reduce the amount of the drug applied or use it less frequently.  Rarely, spots that are treated with hydroquinone can become darker (pseudoochronosis).  Should this occur, patient instructed to stop medication and call the office. The patient verbalized understanding of the proper use and possible adverse effects of hydroquinone.  All of the patient's questions and concerns were addressed.
Humira Counseling:  I discussed with the patient the risks of adalimumab including but not limited to myelosuppression, immunosuppression, autoimmune hepatitis, demyelinating diseases, lymphoma, and serious infections.  The patient understands that monitoring is required including a PPD at baseline and must alert us or the primary physician if symptoms of infection or other concerning signs are noted.
Spironolactone Pregnancy And Lactation Text: This medication can cause feminization of the male fetus and should be avoided during pregnancy. The active metabolite is also found in breast milk.
Hydroxyzine Pregnancy And Lactation Text: This medication is not safe during pregnancy and should not be taken. It is also excreted in breast milk and breast feeding isn't recommended.
Griseofulvin Counseling:  I discussed with the patient the risks of griseofulvin including but not limited to photosensitivity, cytopenia, liver damage, nausea/vomiting and severe allergy.  The patient understands that this medication is best absorbed when taken with a fatty meal (e.g., ice cream or french fries).
Albendazole Counseling:  I discussed with the patient the risks of albendazole including but not limited to cytopenia, kidney damage, nausea/vomiting and severe allergy.  The patient understands that this medication is being used in an off-label manner.
Solaraze Counseling:  I discussed with the patient the risks of Solaraze including but not limited to erythema, scaling, itching, weeping, crusting, and pain.
Clindamycin Pregnancy And Lactation Text: This medication can be used in pregnancy if certain situations. Clindamycin is also present in breast milk.
Birth Control Pills Pregnancy And Lactation Text: This medication should be avoided if pregnant and for the first 30 days post-partum.
Xolair Pregnancy And Lactation Text: This medication is Pregnancy Category B and is considered safe during pregnancy. This medication is excreted in breast milk.
Azithromycin Pregnancy And Lactation Text: This medication is considered safe during pregnancy and is also secreted in breast milk.
Benzoyl Peroxide Counseling: Patient counseled that medicine may cause skin irritation and bleach clothing.  In the event of skin irritation, the patient was advised to reduce the amount of the drug applied or use it less frequently.   The patient verbalized understanding of the proper use and possible adverse effects of benzoyl peroxide.  All of the patient's questions and concerns were addressed.
Minocycline Counseling: Patient advised regarding possible photosensitivity and discoloration of the teeth, skin, lips, tongue and gums.  Patient instructed to avoid sunlight, if possible.  When exposed to sunlight, patients should wear protective clothing, sunglasses, and sunscreen.  The patient was instructed to call the office immediately if the following severe adverse effects occur:  hearing changes, easy bruising/bleeding, severe headache, or vision changes.  The patient verbalized understanding of the proper use and possible adverse effects of minocycline.  All of the patient's questions and concerns were addressed.
Glycopyrrolate Pregnancy And Lactation Text: This medication is Pregnancy Category B and is considered safe during pregnancy. It is unknown if it is excreted breast milk.
Protopic Counseling: Patient may experience a mild burning sensation during topical application. Protopic is not approved in children less than 2 years of age. There have been case reports of hematologic and skin malignancies in patients using topical calcineurin inhibitors although causality is questionable.
Sarecycline Counseling: Patient advised regarding possible photosensitivity and discoloration of the teeth, skin, lips, tongue and gums.  Patient instructed to avoid sunlight, if possible.  When exposed to sunlight, patients should wear protective clothing, sunglasses, and sunscreen.  The patient was instructed to call the office immediately if the following severe adverse effects occur:  hearing changes, easy bruising/bleeding, severe headache, or vision changes.  The patient verbalized understanding of the proper use and possible adverse effects of sarecycline.  All of the patient's questions and concerns were addressed.
5-Fu Counseling: 5-Fluorouracil Counseling:  I discussed with the patient the risks of 5-fluorouracil including but not limited to erythema, scaling, itching, weeping, crusting, and pain.
Ivermectin Counseling:  Patient instructed to take medication on an empty stomach with a full glass of water.  Patient informed of potential adverse effects including but not limited to nausea, diarrhea, dizziness, itching, and swelling of the extremities or lymph nodes.  The patient verbalized understanding of the proper use and possible adverse effects of ivermectin.  All of the patient's questions and concerns were addressed.
Benzoyl Peroxide Pregnancy And Lactation Text: This medication is Pregnancy Category C. It is unknown if benzoyl peroxide is excreted in breast milk.
Skyrizi Counseling: I discussed with the patient the risks of risankizumab-rzaa including but not limited to immunosuppression, and serious infections.  The patient understands that monitoring is required including a PPD at baseline and must alert us or the primary physician if symptoms of infection or other concerning signs are noted.
Metronidazole Pregnancy And Lactation Text: This medication is Pregnancy Category B and considered safe during pregnancy.  It is also excreted in breast milk.
Erythromycin Counseling:  I discussed with the patient the risks of erythromycin including but not limited to GI upset, allergic reaction, drug rash, diarrhea, increase in liver enzymes, and yeast infections.
Eucrisa Counseling: Patient may experience a mild burning sensation during topical application. Eucrisa is not approved in children less than 2 years of age.
SSKI Counseling:  I discussed with the patient the risks of SSKI including but not limited to thyroid abnormalities, metallic taste, GI upset, fever, headache, acne, arthralgias, paraesthesias, lymphadenopathy, easy bleeding, arrhythmias, and allergic reaction.
Dapsone Counseling: I discussed with the patient the risks of dapsone including but not limited to hemolytic anemia, agranulocytosis, rashes, methemoglobinemia, kidney failure, peripheral neuropathy, headaches, GI upset, and liver toxicity.  Patients who start dapsone require monitoring including baseline LFTs and weekly CBCs for the first month, then every month thereafter.  The patient verbalized understanding of the proper use and possible adverse effects of dapsone.  All of the patient's questions and concerns were addressed.
Azithromycin Counseling:  I discussed with the patient the risks of azithromycin including but not limited to GI upset, allergic reaction, drug rash, diarrhea, and yeast infections.
Topical Sulfur Applications Counseling: Topical Sulfur Counseling: Patient counseled that this medication may cause skin irritation or allergic reactions.  In the event of skin irritation, the patient was advised to reduce the amount of the drug applied or use it less frequently.   The patient verbalized understanding of the proper use and possible adverse effects of topical sulfur application.  All of the patient's questions and concerns were addressed.
Hydroxyzine Counseling: Patient advised that the medication is sedating and not to drive a car after taking this medication.  Patient informed of potential adverse effects including but not limited to dry mouth, urinary retention, and blurry vision.  The patient verbalized understanding of the proper use and possible adverse effects of hydroxyzine.  All of the patient's questions and concerns were addressed.
Itraconazole Counseling:  I discussed with the patient the risks of itraconazole including but not limited to liver damage, nausea/vomiting, neuropathy, and severe allergy.  The patient understands that this medication is best absorbed when taken with acidic beverages such as non-diet cola or ginger ale.  The patient understands that monitoring is required including baseline LFTs and repeat LFTs at intervals.  The patient understands that they are to contact us or the primary physician if concerning signs are noted.
Detail Level: Detailed
Acitretin Counseling:  I discussed with the patient the risks of acitretin including but not limited to hair loss, dry lips/skin/eyes, liver damage, hyperlipidemia, depression/suicidal ideation, photosensitivity.  Serious rare side effects can include but are not limited to pancreatitis, pseudotumor cerebri, bony changes, clot formation/stroke/heart attack.  Patient understands that alcohol is contraindicated since it can result in liver toxicity and significantly prolong the elimination of the drug by many years.
Colchicine Counseling:  Patient counseled regarding adverse effects including but not limited to stomach upset (nausea, vomiting, stomach pain, or diarrhea).  Patient instructed to limit alcohol consumption while taking this medication.  Colchicine may reduce blood counts especially with prolonged use.  The patient understands that monitoring of kidney function and blood counts may be required, especially at baseline. The patient verbalized understanding of the proper use and possible adverse effects of colchicine.  All of the patient's questions and concerns were addressed.
Minoxidil Counseling: Minoxidil is a topical medication which can increase blood flow where it is applied. It is uncertain how this medication increases hair growth. Side effects are uncommon and include stinging and allergic reactions.
Clofazimine Counseling:  I discussed with the patient the risks of clofazimine including but not limited to skin and eye pigmentation, liver damage, nausea/vomiting, gastrointestinal bleeding and allergy.
Doxepin Counseling:  Patient advised that the medication is sedating and not to drive a car after taking this medication. Patient informed of potential adverse effects including but not limited to dry mouth, urinary retention, and blurry vision.  The patient verbalized understanding of the proper use and possible adverse effects of doxepin.  All of the patient's questions and concerns were addressed.
High Dose Vitamin A Pregnancy And Lactation Text: High dose vitamin A therapy is contraindicated during pregnancy and breast feeding.
Picato Counseling:  I discussed with the patient the risks of Picato including but not limited to erythema, scaling, itching, weeping, crusting, and pain.
Griseofulvin Pregnancy And Lactation Text: This medication is Pregnancy Category X and is known to cause serious birth defects. It is unknown if this medication is excreted in breast milk but breast feeding should be avoided.
Acitretin Pregnancy And Lactation Text: This medication is Pregnancy Category X and should not be given to women who are pregnant or may become pregnant in the future. This medication is excreted in breast milk.
Gabapentin Counseling: I discussed with the patient the risks of gabapentin including but not limited to dizziness, somnolence, fatigue and ataxia.
Dapsone Pregnancy And Lactation Text: This medication is Pregnancy Category C and is not considered safe during pregnancy or breast feeding.
Isotretinoin Counseling: Patient should get monthly blood tests, not donate blood, not drive at night if vision affected, not share medication, and not undergo elective surgery for 6 months after tx completed. Side effects reviewed, pt to contact office should one occur.
Ilumya Counseling: I discussed with the patient the risks of tildrakizumab including but not limited to immunosuppression, malignancy, posterior leukoencephalopathy syndrome, and serious infections.  The patient understands that monitoring is required including a PPD at baseline and must alert us or the primary physician if symptoms of infection or other concerning signs are noted.
Cimzia Counseling:  I discussed with the patient the risks of Cimzia including but not limited to immunosuppression, allergic reactions and infections.  The patient understands that monitoring is required including a PPD at baseline and must alert us or the primary physician if symptoms of infection or other concerning signs are noted.
Doxycycline Counseling:  Patient counseled regarding possible photosensitivity and increased risk for sunburn.  Patient instructed to avoid sunlight, if possible.  When exposed to sunlight, patients should wear protective clothing, sunglasses, and sunscreen.  The patient was instructed to call the office immediately if the following severe adverse effects occur:  hearing changes, easy bruising/bleeding, severe headache, or vision changes.  The patient verbalized understanding of the proper use and possible adverse effects of doxycycline.  All of the patient's questions and concerns were addressed.
Cyclosporine Counseling:  I discussed with the patient the risks of cyclosporine including but not limited to hypertension, gingival hyperplasia,myelosuppression, immunosuppression, liver damage, kidney damage, neurotoxicity, lymphoma, and serious infections. The patient understands that monitoring is required including baseline blood pressure, CBC, CMP, lipid panel and uric acid, and then 1-2 times monthly CMP and blood pressure.
Rifampin Pregnancy And Lactation Text: This medication is Pregnancy Category C and it isn't know if it is safe during pregnancy. It is also excreted in breast milk and should not be used if you are breast feeding.
Terbinafine Counseling: Patient counseling regarding adverse effects of terbinafine including but not limited to headache, diarrhea, rash, upset stomach, liver function test abnormalities, itching, taste/smell disturbance, nausea, abdominal pain, and flatulence.  There is a rare possibility of liver failure that can occur when taking terbinafine.  The patient understands that a baseline LFT and kidney function test may be required. The patient verbalized understanding of the proper use and possible adverse effects of terbinafine.  All of the patient's questions and concerns were addressed.
Cyclophosphamide Pregnancy And Lactation Text: This medication is Pregnancy Category D and it isn't considered safe during pregnancy. This medication is excreted in breast milk.
Ketoconazole Counseling:   Patient counseled regarding improving absorption with orange juice.  Adverse effects include but are not limited to breast enlargement, headache, diarrhea, nausea, upset stomach, liver function test abnormalities, taste disturbance, and stomach pain.  There is a rare possibility of liver failure that can occur when taking ketoconazole. The patient understands that monitoring of LFTs may be required, especially at baseline. The patient verbalized understanding of the proper use and possible adverse effects of ketoconazole.  All of the patient's questions and concerns were addressed.
Topical Sulfur Applications Pregnancy And Lactation Text: This medication is Pregnancy Category C and has an unknown safety profile during pregnancy. It is unknown if this topical medication is excreted in breast milk.

## 2020-08-20 NOTE — PROCEDURE: ISOTRETINOIN MONITORING
Hypertriglyceridemia Monitoring: I explained this is common when taking isotretinoin. We will monitor closely.
Hypertriglyceridemia Monitoring: I explained this is common when taking isotretinoin. If this worsens they will contact us.
Is Cheilitis Present?: No
Myalgia Treatment: I explained this is common when taking isotretinoin. If this worsens they will contact us. They may try OTC ibuprofen.
Nosebleeds Normal Treatment: I explained this is common when taking isotretinoin. I recommended saline mist in each nostril multiple times a day. If this worsens they will contact us.
Lower Range (In Mg/Kg): 120
Xerosis Normal Treatment: I recommended application of Cetaphil or CeraVe numerous times a day and before going to bed to all dry areas.
Next Month's Dosage: Continue Current Dosage
Weight Units: pounds
Pounds Preamble Statement (Weight Entered In Details Tab): Reported Weight in pounds:
Cheilitis Aggressive Treatment: I recommended application of Vaseline or Aquaphor numerous times a day (as often as every hour) and before going to bed. I also prescribed a topical steroid for twice daily use.
Xerosis Aggressive Treatment: I recommended application of Cetaphil or CeraVe numerous times a day going to bed to all dry areas. I also prescribed a topical steroid for twice daily use.
Dosing Month 1 (Required For Cumulative Dosing): 30mg BID
Headache Monitoring: I recommended monitoring the headaches for now. There is no evidence of increased intracranial pressure. They were instructed to call if the headaches are worsening.
What Is The Patient's Gender: Female
Detail Level: Detailed
Are Labs Available For Review?: Yes
Retinoid Dermatitis Aggressive Treatment: I recommended more frequent application of Cetaphil or CeraVe to the areas of dermatitis. I also prescribed a topical steroid for twice daily use until the dermatitis resolves.
Cheilitis Normal Treatment: I recommended application of Vaseline or Aquaphor numerous times a day (as often as every hour) and before going to bed.
Months Of Therapy Completed: 4
Female Completion Statement: After discussing her treatment course we decided to discontinue isotretinoin therapy at this time. I explained that she would need to continue her birth control methods for at least one month after the last dosage. She should also get a pregnancy test one month after the last dose. She shouldn't donate blood for one month after the last dose. She should call with any new symptoms of depression.
Male Completion Statement: After discussing his treatment course we decided to discontinue isotretinoin therapy at this time. He shouldn't donate blood for one month after the last dose. He should call with any new symptoms of depression.
Xerosis Normal Treatment: I recommended application of Cetaphil or CeraVe numerous times a day going to bed to all dry areas.
Counseling Text: I reviewed the side effect in detail. Patient should get monthly blood tests, not donate blood, not drive at night if vision affected, and not share medication.
Kilograms Preamble Statement (Weight Entered In Details Tab): Reported Weight in kilograms:
Use Therapeutic Ranged Or Therapeutic Target: please select Range or Target
Upper Range (In Mg/Kg): 150
Xerosis Aggressive Treatment: I recommended application of Cetaphil or CeraVe numerous times a day and before going to bed to all dry areas. I also prescribed a topical steroid for twice daily use.
Patient Weight (Optional But Required For Cumulative Dose-Numbers And Decimals Only): 140
Any Nosebleeds?: Yes - Normal Treatment
Target Cumulative Dosage (In Mg/Kg): 135
Retinoid Dermatitis Normal Treatment: I recommended more frequent application of Cetaphil or CeraVe to the areas of dermatitis.
Ipledge Number (Optional): [id number]
Female Pregnancy Counseling Text: Female patients should also be on two forms of birth control while taking this medication and for one month after their last dose.

## 2020-08-20 NOTE — PROCEDURE: COUNSELING
Isotretinoin Counseling: Patient should get monthly blood tests, not donate blood, not drive at night if vision affected, not share medication, and not undergo elective surgery for 6 months after tx completed. Side effects reviewed, pt to contact office should one occur.
Dapsone Counseling: I discussed with the patient the risks of dapsone including but not limited to hemolytic anemia, agranulocytosis, rashes, methemoglobinemia, kidney failure, peripheral neuropathy, headaches, GI upset, and liver toxicity.  Patients who start dapsone require monitoring including baseline LFTs and weekly CBCs for the first month, then every month thereafter.  The patient verbalized understanding of the proper use and possible adverse effects of dapsone.  All of the patient's questions and concerns were addressed.
Include Pregnancy/Lactation Warning?: No
Isotretinoin Pregnancy And Lactation Text: This medication is Pregnancy Category X and is considered extremely dangerous during pregnancy. It is unknown if it is excreted in breast milk.
Birth Control Pills Pregnancy And Lactation Text: This medication should be avoided if pregnant and for the first 30 days post-partum.
Bactrim Pregnancy And Lactation Text: This medication is Pregnancy Category D and is known to cause fetal risk.  It is also excreted in breast milk.
Tazorac Counseling:  Patient advised that medication is irritating and drying.  Patient may need to apply sparingly and wash off after an hour before eventually leaving it on overnight.  The patient verbalized understanding of the proper use and possible adverse effects of tazorac.  All of the patient's questions and concerns were addressed.
Tetracycline Pregnancy And Lactation Text: This medication is Pregnancy Category D and not consider safe during pregnancy. It is also excreted in breast milk.
Topical Clindamycin Counseling: Patient counseled that this medication may cause skin irritation or allergic reactions.  In the event of skin irritation, the patient was advised to reduce the amount of the drug applied or use it less frequently.   The patient verbalized understanding of the proper use and possible adverse effects of clindamycin.  All of the patient's questions and concerns were addressed.
Dapsone Pregnancy And Lactation Text: This medication is Pregnancy Category C and is not considered safe during pregnancy or breast feeding.
Benzoyl Peroxide Pregnancy And Lactation Text: This medication is Pregnancy Category C. It is unknown if benzoyl peroxide is excreted in breast milk.
Topical Retinoid Pregnancy And Lactation Text: This medication is Pregnancy Category C. It is unknown if this medication is excreted in breast milk.
High Dose Vitamin A Pregnancy And Lactation Text: High dose vitamin A therapy is contraindicated during pregnancy and breast feeding.
Minocycline Counseling: Patient advised regarding possible photosensitivity and discoloration of the teeth, skin, lips, tongue and gums.  Patient instructed to avoid sunlight, if possible.  When exposed to sunlight, patients should wear protective clothing, sunglasses, and sunscreen.  The patient was instructed to call the office immediately if the following severe adverse effects occur:  hearing changes, easy bruising/bleeding, severe headache, or vision changes.  The patient verbalized understanding of the proper use and possible adverse effects of minocycline.  All of the patient's questions and concerns were addressed.
Tetracycline Counseling: Patient counseled regarding possible photosensitivity and increased risk for sunburn.  Patient instructed to avoid sunlight, if possible.  When exposed to sunlight, patients should wear protective clothing, sunglasses, and sunscreen.  The patient was instructed to call the office immediately if the following severe adverse effects occur:  hearing changes, easy bruising/bleeding, severe headache, or vision changes.  The patient verbalized understanding of the proper use and possible adverse effects of tetracycline.  All of the patient's questions and concerns were addressed. Patient understands to avoid pregnancy while on therapy due to potential birth defects.
Topical Sulfur Applications Counseling: Topical Sulfur Counseling: Patient counseled that this medication may cause skin irritation or allergic reactions.  In the event of skin irritation, the patient was advised to reduce the amount of the drug applied or use it less frequently.   The patient verbalized understanding of the proper use and possible adverse effects of topical sulfur application.  All of the patient's questions and concerns were addressed.
Benzoyl Peroxide Counseling: Patient counseled that medicine may cause skin irritation and bleach clothing.  In the event of skin irritation, the patient was advised to reduce the amount of the drug applied or use it less frequently.   The patient verbalized understanding of the proper use and possible adverse effects of benzoyl peroxide.  All of the patient's questions and concerns were addressed.
Spironolactone Counseling: Patient advised regarding risks of diarrhea, abdominal pain, hyperkalemia, birth defects (for female patients), liver toxicity and renal toxicity. The patient may need blood work to monitor liver and kidney function and potassium levels while on therapy. The patient verbalized understanding of the proper use and possible adverse effects of spironolactone.  All of the patient's questions and concerns were addressed.
Azithromycin Counseling:  I discussed with the patient the risks of azithromycin including but not limited to GI upset, allergic reaction, drug rash, diarrhea, and yeast infections.
Birth Control Pills Counseling: Birth Control Pill Counseling: I discussed with the patient the potential side effects of OCPs including but not limited to increased risk of stroke, heart attack, thrombophlebitis, deep venous thrombosis, hepatic adenomas, breast changes, GI upset, headaches, and depression.  The patient verbalized understanding of the proper use and possible adverse effects of OCPs. All of the patient's questions and concerns were addressed.
Bactrim Counseling:  I discussed with the patient the risks of sulfa antibiotics including but not limited to GI upset, allergic reaction, drug rash, diarrhea, dizziness, photosensitivity, and yeast infections.  Rarely, more serious reactions can occur including but not limited to aplastic anemia, agranulocytosis, methemoglobinemia, blood dyscrasias, liver or kidney failure, lung infiltrates or desquamative/blistering drug rashes.
Tazorac Pregnancy And Lactation Text: This medication is not safe during pregnancy. It is unknown if this medication is excreted in breast milk.
Topical Sulfur Applications Pregnancy And Lactation Text: This medication is Pregnancy Category C and has an unknown safety profile during pregnancy. It is unknown if this topical medication is excreted in breast milk.
Topical Clindamycin Pregnancy And Lactation Text: This medication is Pregnancy Category B and is considered safe during pregnancy. It is unknown if it is excreted in breast milk.
Topical Retinoid counseling:  Patient advised to apply a pea-sized amount only at bedtime and wait 30 minutes after washing their face before applying.  If too drying, patient may add a non-comedogenic moisturizer. The patient verbalized understanding of the proper use and possible adverse effects of retinoids.  All of the patient's questions and concerns were addressed.
High Dose Vitamin A Counseling: Side effects reviewed, pt to contact office should one occur.
Azithromycin Pregnancy And Lactation Text: This medication is considered safe during pregnancy and is also secreted in breast milk.
Doxycycline Counseling:  Patient counseled regarding possible photosensitivity and increased risk for sunburn.  Patient instructed to avoid sunlight, if possible.  When exposed to sunlight, patients should wear protective clothing, sunglasses, and sunscreen.  The patient was instructed to call the office immediately if the following severe adverse effects occur:  hearing changes, easy bruising/bleeding, severe headache, or vision changes.  The patient verbalized understanding of the proper use and possible adverse effects of doxycycline.  All of the patient's questions and concerns were addressed.
Doxycycline Pregnancy And Lactation Text: This medication is Pregnancy Category D and not consider safe during pregnancy. It is also excreted in breast milk but is considered safe for shorter treatment courses.
Erythromycin Pregnancy And Lactation Text: This medication is Pregnancy Category B and is considered safe during pregnancy. It is also excreted in breast milk.
Detail Level: Detailed
Erythromycin Counseling:  I discussed with the patient the risks of erythromycin including but not limited to GI upset, allergic reaction, drug rash, diarrhea, increase in liver enzymes, and yeast infections.
Sarecycline Counseling: Patient advised regarding possible photosensitivity and discoloration of the teeth, skin, lips, tongue and gums.  Patient instructed to avoid sunlight, if possible.  When exposed to sunlight, patients should wear protective clothing, sunglasses, and sunscreen.  The patient was instructed to call the office immediately if the following severe adverse effects occur:  hearing changes, easy bruising/bleeding, severe headache, or vision changes.  The patient verbalized understanding of the proper use and possible adverse effects of sarecycline.  All of the patient's questions and concerns were addressed.
Spironolactone Pregnancy And Lactation Text: This medication can cause feminization of the male fetus and should be avoided during pregnancy. The active metabolite is also found in breast milk.

## 2020-09-16 ENCOUNTER — APPOINTMENT (RX ONLY)
Dept: URBAN - METROPOLITAN AREA CLINIC 148 | Facility: CLINIC | Age: 20
Setting detail: DERMATOLOGY
End: 2020-09-16

## 2020-09-16 DIAGNOSIS — L70.0 ACNE VULGARIS: ICD-10-CM

## 2020-09-16 DIAGNOSIS — Z79.899 OTHER LONG TERM (CURRENT) DRUG THERAPY: ICD-10-CM

## 2020-09-16 PROCEDURE — ? ORDER TESTS

## 2020-09-16 PROCEDURE — ? ISOTRETINOIN MONITORING

## 2020-09-16 PROCEDURE — ? URINE PREGNANCY TEST

## 2020-09-16 PROCEDURE — ? COUNSELING

## 2020-09-16 PROCEDURE — ? PRESCRIPTION

## 2020-09-16 PROCEDURE — 99213 OFFICE O/P EST LOW 20 MIN: CPT

## 2020-09-16 PROCEDURE — 81025 URINE PREGNANCY TEST: CPT

## 2020-09-16 RX ORDER — ISOTRETINOIN 30 MG/1
CAPSULE, LIQUID FILLED ORAL
Qty: 60 | Refills: 0 | Status: ERX

## 2020-09-16 NOTE — PROCEDURE: URINE PREGNANCY TEST
Urine Pregnancy Test Result: negative
Performed By: Yumiko
Lot # (Optional): [id number]
Detail Level: None
Expiration Date (Optional): 2021-10-31

## 2020-09-16 NOTE — PROCEDURE: COUNSELING
Doxycycline Counseling:  Patient counseled regarding possible photosensitivity and increased risk for sunburn.  Patient instructed to avoid sunlight, if possible.  When exposed to sunlight, patients should wear protective clothing, sunglasses, and sunscreen.  The patient was instructed to call the office immediately if the following severe adverse effects occur:  hearing changes, easy bruising/bleeding, severe headache, or vision changes.  The patient verbalized understanding of the proper use and possible adverse effects of doxycycline.  All of the patient's questions and concerns were addressed.
Doxycycline Pregnancy And Lactation Text: This medication is Pregnancy Category D and not consider safe during pregnancy. It is also excreted in breast milk but is considered safe for shorter treatment courses.
High Dose Vitamin A Counseling: Side effects reviewed, pt to contact office should one occur.
Topical Sulfur Applications Pregnancy And Lactation Text: This medication is Pregnancy Category C and has an unknown safety profile during pregnancy. It is unknown if this topical medication is excreted in breast milk.
Topical Retinoid counseling:  Patient advised to apply a pea-sized amount only at bedtime and wait 30 minutes after washing their face before applying.  If too drying, patient may add a non-comedogenic moisturizer. The patient verbalized understanding of the proper use and possible adverse effects of retinoids.  All of the patient's questions and concerns were addressed.
Detail Level: Detailed
Erythromycin Pregnancy And Lactation Text: This medication is Pregnancy Category B and is considered safe during pregnancy. It is also excreted in breast milk.
Sarecycline Counseling: Patient advised regarding possible photosensitivity and discoloration of the teeth, skin, lips, tongue and gums.  Patient instructed to avoid sunlight, if possible.  When exposed to sunlight, patients should wear protective clothing, sunglasses, and sunscreen.  The patient was instructed to call the office immediately if the following severe adverse effects occur:  hearing changes, easy bruising/bleeding, severe headache, or vision changes.  The patient verbalized understanding of the proper use and possible adverse effects of sarecycline.  All of the patient's questions and concerns were addressed.
Benzoyl Peroxide Pregnancy And Lactation Text: This medication is Pregnancy Category C. It is unknown if benzoyl peroxide is excreted in breast milk.
Spironolactone Pregnancy And Lactation Text: This medication can cause feminization of the male fetus and should be avoided during pregnancy. The active metabolite is also found in breast milk.
Azithromycin Pregnancy And Lactation Text: This medication is considered safe during pregnancy and is also secreted in breast milk.
Include Pregnancy/Lactation Warning?: No
Sarecycline Pregnancy And Lactation Text: This medication is Pregnancy Category D and not consider safe during pregnancy. It is also excreted in breast milk.
Bactrim Pregnancy And Lactation Text: This medication is Pregnancy Category D and is known to cause fetal risk.  It is also excreted in breast milk.
Erythromycin Counseling:  I discussed with the patient the risks of erythromycin including but not limited to GI upset, allergic reaction, drug rash, diarrhea, increase in liver enzymes, and yeast infections.
Dapsone Counseling: I discussed with the patient the risks of dapsone including but not limited to hemolytic anemia, agranulocytosis, rashes, methemoglobinemia, kidney failure, peripheral neuropathy, headaches, GI upset, and liver toxicity.  Patients who start dapsone require monitoring including baseline LFTs and weekly CBCs for the first month, then every month thereafter.  The patient verbalized understanding of the proper use and possible adverse effects of dapsone.  All of the patient's questions and concerns were addressed.
Isotretinoin Counseling: Patient should get monthly blood tests, not donate blood, not drive at night if vision affected, not share medication, and not undergo elective surgery for 6 months after tx completed. Side effects reviewed, pt to contact office should one occur.
Isotretinoin Pregnancy And Lactation Text: This medication is Pregnancy Category X and is considered extremely dangerous during pregnancy. It is unknown if it is excreted in breast milk.
Birth Control Pills Pregnancy And Lactation Text: This medication should be avoided if pregnant and for the first 30 days post-partum.
Tazorac Counseling:  Patient advised that medication is irritating and drying.  Patient may need to apply sparingly and wash off after an hour before eventually leaving it on overnight.  The patient verbalized understanding of the proper use and possible adverse effects of tazorac.  All of the patient's questions and concerns were addressed.
Dapsone Pregnancy And Lactation Text: This medication is Pregnancy Category C and is not considered safe during pregnancy or breast feeding.
Topical Retinoid Pregnancy And Lactation Text: This medication is Pregnancy Category C. It is unknown if this medication is excreted in breast milk.
Topical Clindamycin Counseling: Patient counseled that this medication may cause skin irritation or allergic reactions.  In the event of skin irritation, the patient was advised to reduce the amount of the drug applied or use it less frequently.   The patient verbalized understanding of the proper use and possible adverse effects of clindamycin.  All of the patient's questions and concerns were addressed.
Minocycline Counseling: Patient advised regarding possible photosensitivity and discoloration of the teeth, skin, lips, tongue and gums.  Patient instructed to avoid sunlight, if possible.  When exposed to sunlight, patients should wear protective clothing, sunglasses, and sunscreen.  The patient was instructed to call the office immediately if the following severe adverse effects occur:  hearing changes, easy bruising/bleeding, severe headache, or vision changes.  The patient verbalized understanding of the proper use and possible adverse effects of minocycline.  All of the patient's questions and concerns were addressed.
Tetracycline Counseling: Patient counseled regarding possible photosensitivity and increased risk for sunburn.  Patient instructed to avoid sunlight, if possible.  When exposed to sunlight, patients should wear protective clothing, sunglasses, and sunscreen.  The patient was instructed to call the office immediately if the following severe adverse effects occur:  hearing changes, easy bruising/bleeding, severe headache, or vision changes.  The patient verbalized understanding of the proper use and possible adverse effects of tetracycline.  All of the patient's questions and concerns were addressed. Patient understands to avoid pregnancy while on therapy due to potential birth defects.
Topical Sulfur Applications Counseling: Topical Sulfur Counseling: Patient counseled that this medication may cause skin irritation or allergic reactions.  In the event of skin irritation, the patient was advised to reduce the amount of the drug applied or use it less frequently.   The patient verbalized understanding of the proper use and possible adverse effects of topical sulfur application.  All of the patient's questions and concerns were addressed.
High Dose Vitamin A Pregnancy And Lactation Text: High dose vitamin A therapy is contraindicated during pregnancy and breast feeding.
Spironolactone Counseling: Patient advised regarding risks of diarrhea, abdominal pain, hyperkalemia, birth defects (for female patients), liver toxicity and renal toxicity. The patient may need blood work to monitor liver and kidney function and potassium levels while on therapy. The patient verbalized understanding of the proper use and possible adverse effects of spironolactone.  All of the patient's questions and concerns were addressed.
Benzoyl Peroxide Counseling: Patient counseled that medicine may cause skin irritation and bleach clothing.  In the event of skin irritation, the patient was advised to reduce the amount of the drug applied or use it less frequently.   The patient verbalized understanding of the proper use and possible adverse effects of benzoyl peroxide.  All of the patient's questions and concerns were addressed.
Azithromycin Counseling:  I discussed with the patient the risks of azithromycin including but not limited to GI upset, allergic reaction, drug rash, diarrhea, and yeast infections.
Birth Control Pills Counseling: Birth Control Pill Counseling: I discussed with the patient the potential side effects of OCPs including but not limited to increased risk of stroke, heart attack, thrombophlebitis, deep venous thrombosis, hepatic adenomas, breast changes, GI upset, headaches, and depression.  The patient verbalized understanding of the proper use and possible adverse effects of OCPs. All of the patient's questions and concerns were addressed.
Bactrim Counseling:  I discussed with the patient the risks of sulfa antibiotics including but not limited to GI upset, allergic reaction, drug rash, diarrhea, dizziness, photosensitivity, and yeast infections.  Rarely, more serious reactions can occur including but not limited to aplastic anemia, agranulocytosis, methemoglobinemia, blood dyscrasias, liver or kidney failure, lung infiltrates or desquamative/blistering drug rashes.
Topical Clindamycin Pregnancy And Lactation Text: This medication is Pregnancy Category B and is considered safe during pregnancy. It is unknown if it is excreted in breast milk.
Tazorac Pregnancy And Lactation Text: This medication is not safe during pregnancy. It is unknown if this medication is excreted in breast milk.

## 2020-09-16 NOTE — PROCEDURE: ISOTRETINOIN MONITORING
Hypercholesterolemia Monitoring: I explained this is common when taking isotretinoin. We will monitor closely.
Display Individual Monthly Dosage In The Note (If Yes Will Display All Dosages Which Are Not N/A): yes
Ipledge Number (Optional): [id number]
Hypertriglyceridemia Monitoring: I explained this is common when taking isotretinoin. If this worsens they will contact us.
Next Month's Dosage: Continue Current Dosage
Any Hypercholesterolemia?: No
Pounds Preamble Statement (Weight Entered In Details Tab): Reported Weight in pounds:
Nosebleeds Normal Treatment: I explained this is common when taking isotretinoin. I recommended saline mist in each nostril multiple times a day. If this worsens they will contact us.
Lower Range (In Mg/Kg): 120
Cheilitis Aggressive Treatment: I recommended application of Vaseline or Aquaphor numerous times a day (as often as every hour) and before going to bed. I also prescribed a topical steroid for twice daily use.
Hypertriglyceridemia Treatment: I explained this is common when taking isotretinoin. If this worsens they will contact us. They may try OTC ibuprofen.
Xerosis Aggressive Treatment: I recommended application of Cetaphil or CeraVe numerous times a day going to bed to all dry areas. I also prescribed a topical steroid for twice daily use.
Female Completion Statement: After discussing her treatment course we decided to discontinue isotretinoin therapy at this time. I explained that she would need to continue her birth control methods for at least one month after the last dosage. She should also get a pregnancy test one month after the last dose. She shouldn't donate blood for one month after the last dose. She should call with any new symptoms of depression.
Xerosis Normal Treatment: I recommended application of Cetaphil or CeraVe numerous times a day and before going to bed to all dry areas.
Detail Level: Detailed
Headache Monitoring: I recommended monitoring the headaches for now. There is no evidence of increased intracranial pressure. They were instructed to call if the headaches are worsening.
Retinoid Dermatitis Aggressive Treatment: I recommended more frequent application of Cetaphil or CeraVe to the areas of dermatitis. I also prescribed a topical steroid for twice daily use until the dermatitis resolves.
What Is The Patient's Gender: Female
Months Of Therapy Completed: 5
Weight Units: pounds
Male Completion Statement: After discussing his treatment course we decided to discontinue isotretinoin therapy at this time. He shouldn't donate blood for one month after the last dose. He should call with any new symptoms of depression.
Dosing Month 3 (Required For Cumulative Dosing): 30mg BID
Use Therapeutic Ranged Or Therapeutic Target: please select Range or Target
Cheilitis Normal Treatment: I recommended application of Vaseline or Aquaphor numerous times a day (as often as every hour) and before going to bed.
Counseling Text: I reviewed the side effect in detail. Patient should get monthly blood tests, not donate blood, not drive at night if vision affected, and not share medication.
Kilograms Preamble Statement (Weight Entered In Details Tab): Reported Weight in kilograms:
Xerosis Normal Treatment: I recommended application of Cetaphil or CeraVe numerous times a day going to bed to all dry areas.
Retinoid Dermatitis Normal Treatment: I recommended more frequent application of Cetaphil or CeraVe to the areas of dermatitis.
Is Xerosis Present?: Yes - Normal Treatment
Target Cumulative Dosage (In Mg/Kg): 135
Xerosis Aggressive Treatment: I recommended application of Cetaphil or CeraVe numerous times a day and before going to bed to all dry areas. I also prescribed a topical steroid for twice daily use.
Patient Weight (Optional But Required For Cumulative Dose-Numbers And Decimals Only): 140
Upper Range (In Mg/Kg): 150
Female Pregnancy Counseling Text: Female patients should also be on two forms of birth control while taking this medication and for one month after their last dose.

## 2020-09-16 NOTE — PROCEDURE: HIGH RISK MEDICATION MONITORING
Zyclara Counseling:  I discussed with the patient the risks of imiquimod including but not limited to erythema, scaling, itching, weeping, crusting, and pain.  Patient understands that the inflammatory response to imiquimod is variable from person to person and was educated regarded proper titration schedule.  If flu-like symptoms develop, patient knows to discontinue the medication and contact us.
Arava Counseling:  Patient counseled regarding adverse effects of Arava including but not limited to nausea, vomiting, abnormalities in liver function tests. Patients may develop mouth sores, rash, diarrhea, and abnormalities in blood counts. The patient understands that monitoring is required including LFTs and blood counts.  There is a rare possibility of scarring of the liver and lung problems that can occur when taking methotrexate. Persistent nausea, loss of appetite, pale stools, dark urine, cough, and shortness of breath should be reported immediately. Patient advised to discontinue Arava treatment and consult with a physician prior to attempting conception. The patient will have to undergo a treatment to eliminate Arava from the body prior to conception.
Oxybutynin Pregnancy And Lactation Text: This medication is Pregnancy Category B and is considered safe during pregnancy. It is unknown if it is excreted in breast milk.
Doxepin Pregnancy And Lactation Text: This medication is Pregnancy Category C and it isn't known if it is safe during pregnancy. It is also excreted in breast milk and breast feeding isn't recommended.
Elidel Pregnancy And Lactation Text: This medication is Pregnancy Category C. It is unknown if this medication is excreted in breast milk.
Dupixent Counseling: I discussed with the patient the risks of dupilumab including but not limited to eye infection and irritation, cold sores, injection site reactions, worsening of asthma, allergic reactions and increased risk of parasitic infection.  Live vaccines should be avoided while taking dupilumab. Dupilumab will also interact with certain medications such as warfarin and cyclosporine. The patient understands that monitoring is required and they must alert us or the primary physician if symptoms of infection or other concerning signs are noted.
Infliximab Counseling:  I discussed with the patient the risks of infliximab including but not limited to myelosuppression, immunosuppression, autoimmune hepatitis, demyelinating diseases, lymphoma, and serious infections.  The patient understands that monitoring is required including a PPD at baseline and must alert us or the primary physician if symptoms of infection or other concerning signs are noted.
Quinolones Counseling:  I discussed with the patient the risks of fluoroquinolones including but not limited to GI upset, allergic reaction, drug rash, diarrhea, dizziness, photosensitivity, yeast infections, liver function test abnormalities, tendonitis/tendon rupture.
Carac Pregnancy And Lactation Text: This medication is Pregnancy Category X and contraindicated in pregnancy and in women who may become pregnant. It is unknown if this medication is excreted in breast milk.
Cephalexin Pregnancy And Lactation Text: This medication is Pregnancy Category B and considered safe during pregnancy.  It is also excreted in breast milk but can be used safely for shorter doses.
Spironolactone Counseling: Patient advised regarding risks of diarrhea, abdominal pain, hyperkalemia, birth defects (for female patients), liver toxicity and renal toxicity. The patient may need blood work to monitor liver and kidney function and potassium levels while on therapy. The patient verbalized understanding of the proper use and possible adverse effects of spironolactone.  All of the patient's questions and concerns were addressed.
Thalidomide Counseling: I discussed with the patient the risks of thalidomide including but not limited to birth defects, anxiety, weakness, chest pain, dizziness, cough and severe allergy.
Cimetidine Pregnancy And Lactation Text: This medication is Pregnancy Category B and is considered safe during pregnancy. It is also excreted in breast milk and breast feeding isn't recommended.
Protopic Pregnancy And Lactation Text: This medication is Pregnancy Category C. It is unknown if this medication is excreted in breast milk when applied topically.
Metronidazole Counseling:  I discussed with the patient the risks of metronidazole including but not limited to seizures, nausea/vomiting, a metallic taste in the mouth, nausea/vomiting and severe allergy.
Fluconazole Pregnancy And Lactation Text: This medication is Pregnancy Category C and it isn't know if it is safe during pregnancy. It is also excreted in breast milk.
Nsaids Counseling: NSAID Counseling: I discussed with the patient that NSAIDs should be taken with food. Prolonged use of NSAIDs can result in the development of stomach ulcers.  Patient advised to stop taking NSAIDs if abdominal pain occurs.  The patient verbalized understanding of the proper use and possible adverse effects of NSAIDs.  All of the patient's questions and concerns were addressed.
Hydroxychloroquine Counseling:  I discussed with the patient that a baseline ophthalmologic exam is needed at the start of therapy and every year thereafter while on therapy. A CBC may also be warranted for monitoring.  The side effects of this medication were discussed with the patient, including but not limited to agranulocytosis, aplastic anemia, seizures, rashes, retinopathy, and liver toxicity. Patient instructed to call the office should any adverse effect occur.  The patient verbalized understanding of the proper use and possible adverse effects of Plaquenil.  All the patient's questions and concerns were addressed.
Minoxidil Pregnancy And Lactation Text: This medication has not been assigned a Pregnancy Risk Category but animal studies failed to show danger with the topical medication. It is unknown if the medication is excreted in breast milk.
Cosentyx Counseling:  I discussed with the patient the risks of Cosentyx including but not limited to worsening of Crohn's disease, immunosuppression, allergic reactions and infections.  The patient understands that monitoring is required including a PPD at baseline and must alert us or the primary physician if symptoms of infection or other concerning signs are noted.
Isotretinoin Pregnancy And Lactation Text: This medication is Pregnancy Category X and is considered extremely dangerous during pregnancy. It is unknown if it is excreted in breast milk.
Sarecycline Counseling: Patient advised regarding possible photosensitivity and discoloration of the teeth, skin, lips, tongue and gums.  Patient instructed to avoid sunlight, if possible.  When exposed to sunlight, patients should wear protective clothing, sunglasses, and sunscreen.  The patient was instructed to call the office immediately if the following severe adverse effects occur:  hearing changes, easy bruising/bleeding, severe headache, or vision changes.  The patient verbalized understanding of the proper use and possible adverse effects of sarecycline.  All of the patient's questions and concerns were addressed.
Xeljonathanz Pregnancy And Lactation Text: This medication is Pregnancy Category D and is not considered safe during pregnancy.  The risk during breast feeding is also uncertain.
Enbrel Counseling:  I discussed with the patient the risks of etanercept including but not limited to myelosuppression, immunosuppression, autoimmune hepatitis, demyelinating diseases, lymphoma, and infections.  The patient understands that monitoring is required including a PPD at baseline and must alert us or the primary physician if symptoms of infection or other concerning signs are noted.
Prednisone Pregnancy And Lactation Text: This medication is Pregnancy Category C and it isn't know if it is safe during pregnancy. This medication is excreted in breast milk.
Drysol Counseling:  I discussed with the patient the risks of drysol/aluminum chloride including but not limited to skin rash, itching, irritation, burning.
Otezla Pregnancy And Lactation Text: This medication is Pregnancy Category C and it isn't known if it is safe during pregnancy. It is unknown if it is excreted in breast milk.
Topical Clindamycin Counseling: Patient counseled that this medication may cause skin irritation or allergic reactions.  In the event of skin irritation, the patient was advised to reduce the amount of the drug applied or use it less frequently.   The patient verbalized understanding of the proper use and possible adverse effects of clindamycin.  All of the patient's questions and concerns were addressed.
Cimetidine Counseling:  I discussed with the patient the risks of Cimetidine including but not limited to gynecomastia, headache, diarrhea, nausea, drowsiness, arrhythmias, pancreatitis, skin rashes, psychosis, bone marrow suppression and kidney toxicity.
Tetracycline Counseling: Patient counseled regarding possible photosensitivity and increased risk for sunburn.  Patient instructed to avoid sunlight, if possible.  When exposed to sunlight, patients should wear protective clothing, sunglasses, and sunscreen.  The patient was instructed to call the office immediately if the following severe adverse effects occur:  hearing changes, easy bruising/bleeding, severe headache, or vision changes.  The patient verbalized understanding of the proper use and possible adverse effects of tetracycline.  All of the patient's questions and concerns were addressed. Patient understands to avoid pregnancy while on therapy due to potential birth defects.
Dupixent Pregnancy And Lactation Text: This medication likely crosses the placenta but the risk for the fetus is uncertain. This medication is excreted in breast milk.
Xeljanz Counseling: I discussed with the patient the risks of Xeljanz therapy including increased risk of infection, liver issues, headache, diarrhea, or cold symptoms. Live vaccines should be avoided. They were instructed to call if they have any problems.
Albendazole Pregnancy And Lactation Text: This medication is Pregnancy Category C and it isn't known if it is safe during pregnancy. It is also excreted in breast milk.
Ketoconazole Pregnancy And Lactation Text: This medication is Pregnancy Category C and it isn't know if it is safe during pregnancy. It is also excreted in breast milk and breast feeding isn't recommended.
Azathioprine Counseling:  I discussed with the patient the risks of azathioprine including but not limited to myelosuppression, immunosuppression, hepatotoxicity, lymphoma, and infections.  The patient understands that monitoring is required including baseline LFTs, Creatinine, possible TPMP genotyping and weekly CBCs for the first month and then every 2 weeks thereafter.  The patient verbalized understanding of the proper use and possible adverse effects of azathioprine.  All of the patient's questions and concerns were addressed.
Otezla Counseling: The side effects of Otezla were discussed with the patient, including but not limited to worsening or new depression, weight loss, diarrhea, nausea, upper respiratory tract infection, and headache. Patient instructed to call the office should any adverse effect occur.  The patient verbalized understanding of the proper use and possible adverse effects of Otezla.  All the patient's questions and concerns were addressed.
Siliq Pregnancy And Lactation Text: The risk during pregnancy and breastfeeding is uncertain with this medication.
Cellcept Pregnancy And Lactation Text: This medication is Pregnancy Category D and isn't considered safe during pregnancy. It is unknown if this medication is excreted in breast milk.
Imiquimod Counseling:  I discussed with the patient the risks of imiquimod including but not limited to erythema, scaling, itching, weeping, crusting, and pain.  Patient understands that the inflammatory response to imiquimod is variable from person to person and was educated regarded proper titration schedule.  If flu-like symptoms develop, patient knows to discontinue the medication and contact us.
Cyclophosphamide Counseling:  I discussed with the patient the risks of cyclophosphamide including but not limited to hair loss, hormonal abnormalities, decreased fertility, abdominal pain, diarrhea, nausea and vomiting, bone marrow suppression and infection. The patient understands that monitoring is required while taking this medication.
Benzoyl Peroxide Counseling: Patient counseled that medicine may cause skin irritation and bleach clothing.  In the event of skin irritation, the patient was advised to reduce the amount of the drug applied or use it less frequently.   The patient verbalized understanding of the proper use and possible adverse effects of benzoyl peroxide.  All of the patient's questions and concerns were addressed.
Siliq Counseling:  I discussed with the patient the risks of Siliq including but not limited to new or worsening depression, suicidal thoughts and behavior, immunosuppression, malignancy, posterior leukoencephalopathy syndrome, and serious infections.  The patient understands that monitoring is required including a PPD at baseline and must alert us or the primary physician if symptoms of infection or other concerning signs are noted. There is also a special program designed to monitor depression which is required with Siliq.
Stelara Counseling:  I discussed with the patient the risks of ustekinumab including but not limited to immunosuppression, malignancy, posterior leukoencephalopathy syndrome, and serious infections.  The patient understands that monitoring is required including a PPD at baseline and must alert us or the primary physician if symptoms of infection or other concerning signs are noted.
Bactrim Counseling:  I discussed with the patient the risks of sulfa antibiotics including but not limited to GI upset, allergic reaction, drug rash, diarrhea, dizziness, photosensitivity, and yeast infections.  Rarely, more serious reactions can occur including but not limited to aplastic anemia, agranulocytosis, methemoglobinemia, blood dyscrasias, liver or kidney failure, lung infiltrates or desquamative/blistering drug rashes.
Topical Retinoid counseling:  Patient advised to apply a pea-sized amount only at bedtime and wait 30 minutes after washing their face before applying.  If too drying, patient may add a non-comedogenic moisturizer. The patient verbalized understanding of the proper use and possible adverse effects of retinoids.  All of the patient's questions and concerns were addressed.
Xolair Counseling:  Patient informed of potential adverse effects including but not limited to fever, muscle aches, rash and allergic reactions.  The patient verbalized understanding of the proper use and possible adverse effects of Xolair.  All of the patient's questions and concerns were addressed.
Glycopyrrolate Counseling:  I discussed with the patient the risks of glycopyrrolate including but not limited to skin rash, drowsiness, dry mouth, difficulty urinating, and blurred vision.
Bexarotene Counseling:  I discussed with the patient the risks of bexarotene including but not limited to hair loss, dry lips/skin/eyes, liver abnormalities, hyperlipidemia, pancreatitis, depression/suicidal ideation, photosensitivity, drug rash/allergic reactions, hypothyroidism, anemia, leukopenia, infection, cataracts, and teratogenicity.  Patient understands that they will need regular blood tests to check lipid profile, liver function tests, white blood cell count, thyroid function tests and pregnancy test if applicable.
Erivedge Counseling- I discussed with the patient the risks of Erivedge including but not limited to nausea, vomiting, diarrhea, constipation, weight loss, changes in the sense of taste, decreased appetite, muscle spasms, and hair loss.  The patient verbalized understanding of the proper use and possible adverse effects of Erivedge.  All of the patient's questions and concerns were addressed.
Tetracycline Pregnancy And Lactation Text: This medication is Pregnancy Category D and not consider safe during pregnancy. It is also excreted in breast milk.
Nsaids Pregnancy And Lactation Text: These medications are considered safe up to 30 weeks gestation. It is excreted in breast milk.
Elidel Counseling: Patient may experience a mild burning sensation during topical application. Elidel is not approved in children less than 2 years of age. There have been case reports of hematologic and skin malignancies in patients using topical calcineurin inhibitors although causality is questionable.
Doxycycline Pregnancy And Lactation Text: This medication is Pregnancy Category D and not consider safe during pregnancy. It is also excreted in breast milk but is considered safe for shorter treatment courses.
Fluconazole Counseling:  Patient counseled regarding adverse effects of fluconazole including but not limited to headache, diarrhea, nausea, upset stomach, liver function test abnormalities, taste disturbance, and stomach pain.  There is a rare possibility of liver failure that can occur when taking fluconazole.  The patient understands that monitoring of LFTs and kidney function test may be required, especially at baseline. The patient verbalized understanding of the proper use and possible adverse effects of fluconazole.  All of the patient's questions and concerns were addressed.
Odomzo Counseling- I discussed with the patient the risks of Odomzo including but not limited to nausea, vomiting, diarrhea, constipation, weight loss, changes in the sense of taste, decreased appetite, muscle spasms, and hair loss.  The patient verbalized understanding of the proper use and possible adverse effects of Odomzo.  All of the patient's questions and concerns were addressed.
Valtrex Pregnancy And Lactation Text: this medication is Pregnancy Category B and is considered safe during pregnancy. This medication is not directly found in breast milk but it's metabolite acyclovir is present.
Taltz Counseling: I discussed with the patient the risks of ixekizumab including but not limited to immunosuppression, serious infections, worsening of inflammatory bowel disease and drug reactions.  The patient understands that monitoring is required including a PPD at baseline and must alert us or the primary physician if symptoms of infection or other concerning signs are noted.
Doxepin Counseling:  Patient advised that the medication is sedating and not to drive a car after taking this medication. Patient informed of potential adverse effects including but not limited to dry mouth, urinary retention, and blurry vision.  The patient verbalized understanding of the proper use and possible adverse effects of doxepin.  All of the patient's questions and concerns were addressed.
Bactrim Pregnancy And Lactation Text: This medication is Pregnancy Category D and is known to cause fetal risk.  It is also excreted in breast milk.
Prednisone Counseling:  I discussed with the patient the risks of prolonged use of prednisone including but not limited to weight gain, insomnia, osteoporosis, mood changes, diabetes, susceptibility to infection, glaucoma and high blood pressure.  In cases where prednisone use is prolonged, patients should be monitored with blood pressure checks, serum glucose levels and an eye exam.  Additionally, the patient may need to be placed on GI prophylaxis, PCP prophylaxis, and calcium and vitamin D supplementation and/or a bisphosphonate.  The patient verbalized understanding of the proper use and the possible adverse effects of prednisone.  All of the patient's questions and concerns were addressed.
Hydroquinone Counseling:  Patient advised that medication may result in skin irritation, lightening (hypopigmentation), dryness, and burning.  In the event of skin irritation, the patient was advised to reduce the amount of the drug applied or use it less frequently.  Rarely, spots that are treated with hydroquinone can become darker (pseudoochronosis).  Should this occur, patient instructed to stop medication and call the office. The patient verbalized understanding of the proper use and possible adverse effects of hydroquinone.  All of the patient's questions and concerns were addressed.
Humira Counseling:  I discussed with the patient the risks of adalimumab including but not limited to myelosuppression, immunosuppression, autoimmune hepatitis, demyelinating diseases, lymphoma, and serious infections.  The patient understands that monitoring is required including a PPD at baseline and must alert us or the primary physician if symptoms of infection or other concerning signs are noted.
Methotrexate Counseling:  Patient counseled regarding adverse effects of methotrexate including but not limited to nausea, vomiting, abnormalities in liver function tests. Patients may develop mouth sores, rash, diarrhea, and abnormalities in blood counts. The patient understands that monitoring is required including LFT's and blood counts.  There is a rare possibility of scarring of the liver and lung problems that can occur when taking methotrexate. Persistent nausea, loss of appetite, pale stools, dark urine, cough, and shortness of breath should be reported immediately. Patient advised to discontinue methotrexate treatment at least three months before attempting to become pregnant.  I discussed the need for folate supplements while taking methotrexate.  These supplements can decrease side effects during methotrexate treatment. The patient verbalized understanding of the proper use and possible adverse effects of methotrexate.  All of the patient's questions and concerns were addressed.
Hydroxyzine Pregnancy And Lactation Text: This medication is not safe during pregnancy and should not be taken. It is also excreted in breast milk and breast feeding isn't recommended.
Humira Pregnancy And Lactation Text: This medication is Pregnancy Category B and is considered safe during pregnancy. It is unknown if this medication is excreted in breast milk.
Rituxan Pregnancy And Lactation Text: This medication is Pregnancy Category C and it isn't know if it is safe during pregnancy. It is unknown if this medication is excreted in breast milk but similar antibodies are known to be excreted.
Oxybutynin Counseling:  I discussed with the patient the risks of oxybutynin including but not limited to skin rash, drowsiness, dry mouth, difficulty urinating, and blurred vision.
Carac Counseling:  I discussed with the patient the risks of Carac including but not limited to erythema, scaling, itching, weeping, crusting, and pain.
Spironolactone Pregnancy And Lactation Text: This medication can cause feminization of the male fetus and should be avoided during pregnancy. The active metabolite is also found in breast milk.
Erythromycin Pregnancy And Lactation Text: This medication is Pregnancy Category B and is considered safe during pregnancy. It is also excreted in breast milk.
Clindamycin Pregnancy And Lactation Text: This medication can be used in pregnancy if certain situations. Clindamycin is also present in breast milk.
Odomzo Pregnancy And Lactation Text: This medication is Pregnancy Category X and is absolutely contraindicated during pregnancy. It is unknown if it is excreted in breast milk.
Cimzia Counseling:  I discussed with the patient the risks of Cimzia including but not limited to immunosuppression, allergic reactions and infections.  The patient understands that monitoring is required including a PPD at baseline and must alert us or the primary physician if symptoms of infection or other concerning signs are noted.
Solaraze Pregnancy And Lactation Text: This medication is Pregnancy Category B and is considered safe. There is some data to suggest avoiding during the third trimester. It is unknown if this medication is excreted in breast milk.
Include Pregnancy/Lactation Warning?: No
Sski Pregnancy And Lactation Text: This medication is Pregnancy Category D and isn't considered safe during pregnancy. It is excreted in breast milk.
Tazorac Pregnancy And Lactation Text: This medication is not safe during pregnancy. It is unknown if this medication is excreted in breast milk.
Xolair Pregnancy And Lactation Text: This medication is Pregnancy Category B and is considered safe during pregnancy. This medication is excreted in breast milk.
Minocycline Counseling: Patient advised regarding possible photosensitivity and discoloration of the teeth, skin, lips, tongue and gums.  Patient instructed to avoid sunlight, if possible.  When exposed to sunlight, patients should wear protective clothing, sunglasses, and sunscreen.  The patient was instructed to call the office immediately if the following severe adverse effects occur:  hearing changes, easy bruising/bleeding, severe headache, or vision changes.  The patient verbalized understanding of the proper use and possible adverse effects of minocycline.  All of the patient's questions and concerns were addressed.
Azithromycin Pregnancy And Lactation Text: This medication is considered safe during pregnancy and is also secreted in breast milk.
Benzoyl Peroxide Pregnancy And Lactation Text: This medication is Pregnancy Category C. It is unknown if benzoyl peroxide is excreted in breast milk.
Glycopyrrolate Pregnancy And Lactation Text: This medication is Pregnancy Category B and is considered safe during pregnancy. It is unknown if it is excreted breast milk.
Valtrex Counseling: I discussed with the patient the risks of valacyclovir including but not limited to kidney damage, nausea, vomiting and severe allergy.  The patient understands that if the infection seems to be worsening or is not improving, they are to call.
Protopic Counseling: Patient may experience a mild burning sensation during topical application. Protopic is not approved in children less than 2 years of age. There have been case reports of hematologic and skin malignancies in patients using topical calcineurin inhibitors although causality is questionable.
5-Fu Counseling: 5-Fluorouracil Counseling:  I discussed with the patient the risks of 5-fluorouracil including but not limited to erythema, scaling, itching, weeping, crusting, and pain.
Ivermectin Counseling:  Patient instructed to take medication on an empty stomach with a full glass of water.  Patient informed of potential adverse effects including but not limited to nausea, diarrhea, dizziness, itching, and swelling of the extremities or lymph nodes.  The patient verbalized understanding of the proper use and possible adverse effects of ivermectin.  All of the patient's questions and concerns were addressed.
Albendazole Counseling:  I discussed with the patient the risks of albendazole including but not limited to cytopenia, kidney damage, nausea/vomiting and severe allergy.  The patient understands that this medication is being used in an off-label manner.
Griseofulvin Counseling:  I discussed with the patient the risks of griseofulvin including but not limited to photosensitivity, cytopenia, liver damage, nausea/vomiting and severe allergy.  The patient understands that this medication is best absorbed when taken with a fatty meal (e.g., ice cream or french fries).
Erythromycin Counseling:  I discussed with the patient the risks of erythromycin including but not limited to GI upset, allergic reaction, drug rash, diarrhea, increase in liver enzymes, and yeast infections.
Simponi Counseling:  I discussed with the patient the risks of golimumab including but not limited to myelosuppression, immunosuppression, autoimmune hepatitis, demyelinating diseases, lymphoma, and serious infections.  The patient understands that monitoring is required including a PPD at baseline and must alert us or the primary physician if symptoms of infection or other concerning signs are noted.
Eucrisa Counseling: Patient may experience a mild burning sensation during topical application. Eucrisa is not approved in children less than 2 years of age.
Solaraze Counseling:  I discussed with the patient the risks of Solaraze including but not limited to erythema, scaling, itching, weeping, crusting, and pain.
SSKI Counseling:  I discussed with the patient the risks of SSKI including but not limited to thyroid abnormalities, metallic taste, GI upset, fever, headache, acne, arthralgias, paraesthesias, lymphadenopathy, easy bleeding, arrhythmias, and allergic reaction.
Clofazimine Pregnancy And Lactation Text: This medication is Pregnancy Category C and isn't considered safe during pregnancy. It is excreted in breast milk.
Dapsone Counseling: I discussed with the patient the risks of dapsone including but not limited to hemolytic anemia, agranulocytosis, rashes, methemoglobinemia, kidney failure, peripheral neuropathy, headaches, GI upset, and liver toxicity.  Patients who start dapsone require monitoring including baseline LFTs and weekly CBCs for the first month, then every month thereafter.  The patient verbalized understanding of the proper use and possible adverse effects of dapsone.  All of the patient's questions and concerns were addressed.
Birth Control Pills Pregnancy And Lactation Text: This medication should be avoided if pregnant and for the first 30 days post-partum.
Acitretin Counseling:  I discussed with the patient the risks of acitretin including but not limited to hair loss, dry lips/skin/eyes, liver damage, hyperlipidemia, depression/suicidal ideation, photosensitivity.  Serious rare side effects can include but are not limited to pancreatitis, pseudotumor cerebri, bony changes, clot formation/stroke/heart attack.  Patient understands that alcohol is contraindicated since it can result in liver toxicity and significantly prolong the elimination of the drug by many years.
Detail Level: Detailed
Itraconazole Counseling:  I discussed with the patient the risks of itraconazole including but not limited to liver damage, nausea/vomiting, neuropathy, and severe allergy.  The patient understands that this medication is best absorbed when taken with acidic beverages such as non-diet cola or ginger ale.  The patient understands that monitoring is required including baseline LFTs and repeat LFTs at intervals.  The patient understands that they are to contact us or the primary physician if concerning signs are noted.
Minoxidil Counseling: Minoxidil is a topical medication which can increase blood flow where it is applied. It is uncertain how this medication increases hair growth. Side effects are uncommon and include stinging and allergic reactions.
Colchicine Counseling:  Patient counseled regarding adverse effects including but not limited to stomach upset (nausea, vomiting, stomach pain, or diarrhea).  Patient instructed to limit alcohol consumption while taking this medication.  Colchicine may reduce blood counts especially with prolonged use.  The patient understands that monitoring of kidney function and blood counts may be required, especially at baseline. The patient verbalized understanding of the proper use and possible adverse effects of colchicine.  All of the patient's questions and concerns were addressed.
Clofazimine Counseling:  I discussed with the patient the risks of clofazimine including but not limited to skin and eye pigmentation, liver damage, nausea/vomiting, gastrointestinal bleeding and allergy.
High Dose Vitamin A Pregnancy And Lactation Text: High dose vitamin A therapy is contraindicated during pregnancy and breast feeding.
Picato Counseling:  I discussed with the patient the risks of Picato including but not limited to erythema, scaling, itching, weeping, crusting, and pain.
Skyrizi Counseling: I discussed with the patient the risks of risankizumab-rzaa including but not limited to immunosuppression, and serious infections.  The patient understands that monitoring is required including a PPD at baseline and must alert us or the primary physician if symptoms of infection or other concerning signs are noted.
Acitretin Pregnancy And Lactation Text: This medication is Pregnancy Category X and should not be given to women who are pregnant or may become pregnant in the future. This medication is excreted in breast milk.
Metronidazole Pregnancy And Lactation Text: This medication is Pregnancy Category B and considered safe during pregnancy.  It is also excreted in breast milk.
Gabapentin Counseling: I discussed with the patient the risks of gabapentin including but not limited to dizziness, somnolence, fatigue and ataxia.
Dapsone Pregnancy And Lactation Text: This medication is Pregnancy Category C and is not considered safe during pregnancy or breast feeding.
Azithromycin Counseling:  I discussed with the patient the risks of azithromycin including but not limited to GI upset, allergic reaction, drug rash, diarrhea, and yeast infections.
Topical Sulfur Applications Counseling: Topical Sulfur Counseling: Patient counseled that this medication may cause skin irritation or allergic reactions.  In the event of skin irritation, the patient was advised to reduce the amount of the drug applied or use it less frequently.   The patient verbalized understanding of the proper use and possible adverse effects of topical sulfur application.  All of the patient's questions and concerns were addressed.
Hydroxyzine Counseling: Patient advised that the medication is sedating and not to drive a car after taking this medication.  Patient informed of potential adverse effects including but not limited to dry mouth, urinary retention, and blurry vision.  The patient verbalized understanding of the proper use and possible adverse effects of hydroxyzine.  All of the patient's questions and concerns were addressed.
Rifampin Pregnancy And Lactation Text: This medication is Pregnancy Category C and it isn't know if it is safe during pregnancy. It is also excreted in breast milk and should not be used if you are breast feeding.
Cyclosporine Counseling:  I discussed with the patient the risks of cyclosporine including but not limited to hypertension, gingival hyperplasia,myelosuppression, immunosuppression, liver damage, kidney damage, neurotoxicity, lymphoma, and serious infections. The patient understands that monitoring is required including baseline blood pressure, CBC, CMP, lipid panel and uric acid, and then 1-2 times monthly CMP and blood pressure.
Terbinafine Counseling: Patient counseling regarding adverse effects of terbinafine including but not limited to headache, diarrhea, rash, upset stomach, liver function test abnormalities, itching, taste/smell disturbance, nausea, abdominal pain, and flatulence.  There is a rare possibility of liver failure that can occur when taking terbinafine.  The patient understands that a baseline LFT and kidney function test may be required. The patient verbalized understanding of the proper use and possible adverse effects of terbinafine.  All of the patient's questions and concerns were addressed.
Isotretinoin Counseling: Patient should get monthly blood tests, not donate blood, not drive at night if vision affected, not share medication, and not undergo elective surgery for 6 months after tx completed. Side effects reviewed, pt to contact office should one occur.
Cyclophosphamide Pregnancy And Lactation Text: This medication is Pregnancy Category D and it isn't considered safe during pregnancy. This medication is excreted in breast milk.
Topical Sulfur Applications Pregnancy And Lactation Text: This medication is Pregnancy Category C and has an unknown safety profile during pregnancy. It is unknown if this topical medication is excreted in breast milk.
Doxycycline Counseling:  Patient counseled regarding possible photosensitivity and increased risk for sunburn.  Patient instructed to avoid sunlight, if possible.  When exposed to sunlight, patients should wear protective clothing, sunglasses, and sunscreen.  The patient was instructed to call the office immediately if the following severe adverse effects occur:  hearing changes, easy bruising/bleeding, severe headache, or vision changes.  The patient verbalized understanding of the proper use and possible adverse effects of doxycycline.  All of the patient's questions and concerns were addressed.
Griseofulvin Pregnancy And Lactation Text: This medication is Pregnancy Category X and is known to cause serious birth defects. It is unknown if this medication is excreted in breast milk but breast feeding should be avoided.
Cephalexin Counseling: I counseled the patient regarding use of cephalexin as an antibiotic for prophylactic and/or therapeutic purposes. Cephalexin (commonly prescribed under brand name Keflex) is a cephalosporin antibiotic which is active against numerous classes of bacteria, including most skin bacteria. Side effects may include nausea, diarrhea, gastrointestinal upset, rash, hives, yeast infections, and in rare cases, hepatitis, kidney disease, seizures, fever, confusion, neurologic symptoms, and others. Patients with severe allergies to penicillin medications are cautioned that there is about a 10% incidence of cross-reactivity with cephalosporins. When possible, patients with penicillin allergies should use alternatives to cephalosporins for antibiotic therapy.
Drysol Pregnancy And Lactation Text: This medication is considered safe during pregnancy and breast feeding.
Rifampin Counseling: I discussed with the patient the risks of rifampin including but not limited to liver damage, kidney damage, red-orange body fluids, nausea/vomiting and severe allergy.
Ilumya Counseling: I discussed with the patient the risks of tildrakizumab including but not limited to immunosuppression, malignancy, posterior leukoencephalopathy syndrome, and serious infections.  The patient understands that monitoring is required including a PPD at baseline and must alert us or the primary physician if symptoms of infection or other concerning signs are noted.
High Dose Vitamin A Counseling: Side effects reviewed, pt to contact office should one occur.
Cimzia Pregnancy And Lactation Text: This medication crosses the placenta but can be considered safe in certain situations. Cimzia may be excreted in breast milk.
Ketoconazole Counseling:   Patient counseled regarding improving absorption with orange juice.  Adverse effects include but are not limited to breast enlargement, headache, diarrhea, nausea, upset stomach, liver function test abnormalities, taste disturbance, and stomach pain.  There is a rare possibility of liver failure that can occur when taking ketoconazole. The patient understands that monitoring of LFTs may be required, especially at baseline. The patient verbalized understanding of the proper use and possible adverse effects of ketoconazole.  All of the patient's questions and concerns were addressed.
Birth Control Pills Counseling: Birth Control Pill Counseling: I discussed with the patient the potential side effects of OCPs including but not limited to increased risk of stroke, heart attack, thrombophlebitis, deep venous thrombosis, hepatic adenomas, breast changes, GI upset, headaches, and depression.  The patient verbalized understanding of the proper use and possible adverse effects of OCPs. All of the patient's questions and concerns were addressed.
Bexarotene Pregnancy And Lactation Text: This medication is Pregnancy Category X and should not be given to women who are pregnant or may become pregnant. This medication should not be used if you are breast feeding.
Tremfya Counseling: I discussed with the patient the risks of guselkumab including but not limited to immunosuppression, serious infections, worsening of inflammatory bowel disease and drug reactions.  The patient understands that monitoring is required including a PPD at baseline and must alert us or the primary physician if symptoms of infection or other concerning signs are noted.
Tazorac Counseling:  Patient advised that medication is irritating and drying.  Patient may need to apply sparingly and wash off after an hour before eventually leaving it on overnight.  The patient verbalized understanding of the proper use and possible adverse effects of tazorac.  All of the patient's questions and concerns were addressed.
Clindamycin Counseling: I counseled the patient regarding use of clindamycin as an antibiotic for prophylactic and/or therapeutic purposes. Clindamycin is active against numerous classes of bacteria, including skin bacteria. Side effects may include nausea, diarrhea, gastrointestinal upset, rash, hives, yeast infections, and in rare cases, colitis.
Methotrexate Pregnancy And Lactation Text: This medication is Pregnancy Category X and is known to cause fetal harm. This medication is excreted in breast milk.
Cellcept Counseling:  I discussed with the patient the risks of mycophenolate mofetil including but not limited to infection/immunosuppression, GI upset, hypokalemia, hypercholesterolemia, bone marrow suppression, lymphoproliferative disorders, malignancy, GI ulceration/bleed/perforation, colitis, interstitial lung disease, kidney failure, progressive multifocal leukoencephalopathy, and birth defects.  The patient understands that monitoring is required including a baseline creatinine and regular CBC testing. In addition, patient must alert us immediately if symptoms of infection or other concerning signs are noted.
Hydroxychloroquine Pregnancy And Lactation Text: This medication has been shown to cause fetal harm but it isn't assigned a Pregnancy Risk Category. There are small amounts excreted in breast milk.
Rituxan Counseling:  I discussed with the patient the risks of Rituxan infusions. Side effects can include infusion reactions, severe drug rashes including mucocutaneous reactions, reactivation of latent hepatitis and other infections and rarely progressive multifocal leukoencephalopathy.  All of the patient's questions and concerns were addressed.

## 2020-10-22 ENCOUNTER — APPOINTMENT (RX ONLY)
Dept: URBAN - METROPOLITAN AREA CLINIC 148 | Facility: CLINIC | Age: 20
Setting detail: DERMATOLOGY
End: 2020-10-22

## 2020-10-22 DIAGNOSIS — Z79.899 OTHER LONG TERM (CURRENT) DRUG THERAPY: ICD-10-CM

## 2020-10-22 DIAGNOSIS — L70.0 ACNE VULGARIS: ICD-10-CM

## 2020-10-22 PROCEDURE — ? URINE PREGNANCY TEST

## 2020-10-22 PROCEDURE — ? COUNSELING

## 2020-10-22 PROCEDURE — ? ORDER TESTS

## 2020-10-22 PROCEDURE — 99214 OFFICE O/P EST MOD 30 MIN: CPT

## 2020-10-22 PROCEDURE — ? HIGH RISK MEDICATION MONITORING

## 2020-10-22 PROCEDURE — ? ISOTRETINOIN MONITORING

## 2020-10-22 PROCEDURE — 81025 URINE PREGNANCY TEST: CPT

## 2020-10-22 NOTE — PROCEDURE: URINE PREGNANCY TEST
Urine Pregnancy Test Result: negative
Performed By: Vivienne
Lot # (Optional): [id number]
Detail Level: None
Expiration Date (Optional): 2021-10-31

## 2020-10-22 NOTE — PROCEDURE: COUNSELING
Bactrim Counseling:  I discussed with the patient the risks of sulfa antibiotics including but not limited to GI upset, allergic reaction, drug rash, diarrhea, dizziness, photosensitivity, and yeast infections.  Rarely, more serious reactions can occur including but not limited to aplastic anemia, agranulocytosis, methemoglobinemia, blood dyscrasias, liver or kidney failure, lung infiltrates or desquamative/blistering drug rashes.
Topical Retinoid Pregnancy And Lactation Text: This medication is Pregnancy Category C. It is unknown if this medication is excreted in breast milk.
Tetracycline Pregnancy And Lactation Text: This medication is Pregnancy Category D and not consider safe during pregnancy. It is also excreted in breast milk.
High Dose Vitamin A Pregnancy And Lactation Text: High dose vitamin A therapy is contraindicated during pregnancy and breast feeding.
Minocycline Counseling: Patient advised regarding possible photosensitivity and discoloration of the teeth, skin, lips, tongue and gums.  Patient instructed to avoid sunlight, if possible.  When exposed to sunlight, patients should wear protective clothing, sunglasses, and sunscreen.  The patient was instructed to call the office immediately if the following severe adverse effects occur:  hearing changes, easy bruising/bleeding, severe headache, or vision changes.  The patient verbalized understanding of the proper use and possible adverse effects of minocycline.  All of the patient's questions and concerns were addressed.
Tetracycline Counseling: Patient counseled regarding possible photosensitivity and increased risk for sunburn.  Patient instructed to avoid sunlight, if possible.  When exposed to sunlight, patients should wear protective clothing, sunglasses, and sunscreen.  The patient was instructed to call the office immediately if the following severe adverse effects occur:  hearing changes, easy bruising/bleeding, severe headache, or vision changes.  The patient verbalized understanding of the proper use and possible adverse effects of tetracycline.  All of the patient's questions and concerns were addressed. Patient understands to avoid pregnancy while on therapy due to potential birth defects.
Topical Sulfur Applications Counseling: Topical Sulfur Counseling: Patient counseled that this medication may cause skin irritation or allergic reactions.  In the event of skin irritation, the patient was advised to reduce the amount of the drug applied or use it less frequently.   The patient verbalized understanding of the proper use and possible adverse effects of topical sulfur application.  All of the patient's questions and concerns were addressed.
Azithromycin Counseling:  I discussed with the patient the risks of azithromycin including but not limited to GI upset, allergic reaction, drug rash, diarrhea, and yeast infections.
Use Enhanced Medication Counseling?: No
Benzoyl Peroxide Counseling: Patient counseled that medicine may cause skin irritation and bleach clothing.  In the event of skin irritation, the patient was advised to reduce the amount of the drug applied or use it less frequently.   The patient verbalized understanding of the proper use and possible adverse effects of benzoyl peroxide.  All of the patient's questions and concerns were addressed.
Birth Control Pills Counseling: Birth Control Pill Counseling: I discussed with the patient the potential side effects of OCPs including but not limited to increased risk of stroke, heart attack, thrombophlebitis, deep venous thrombosis, hepatic adenomas, breast changes, GI upset, headaches, and depression.  The patient verbalized understanding of the proper use and possible adverse effects of OCPs. All of the patient's questions and concerns were addressed.
Topical Clindamycin Pregnancy And Lactation Text: This medication is Pregnancy Category B and is considered safe during pregnancy. It is unknown if it is excreted in breast milk.
Topical Sulfur Applications Pregnancy And Lactation Text: This medication is Pregnancy Category C and has an unknown safety profile during pregnancy. It is unknown if this topical medication is excreted in breast milk.
Topical Retinoid counseling:  Patient advised to apply a pea-sized amount only at bedtime and wait 30 minutes after washing their face before applying.  If too drying, patient may add a non-comedogenic moisturizer. The patient verbalized understanding of the proper use and possible adverse effects of retinoids.  All of the patient's questions and concerns were addressed.
Tazorac Pregnancy And Lactation Text: This medication is not safe during pregnancy. It is unknown if this medication is excreted in breast milk.
Doxycycline Counseling:  Patient counseled regarding possible photosensitivity and increased risk for sunburn.  Patient instructed to avoid sunlight, if possible.  When exposed to sunlight, patients should wear protective clothing, sunglasses, and sunscreen.  The patient was instructed to call the office immediately if the following severe adverse effects occur:  hearing changes, easy bruising/bleeding, severe headache, or vision changes.  The patient verbalized understanding of the proper use and possible adverse effects of doxycycline.  All of the patient's questions and concerns were addressed.
Doxycycline Pregnancy And Lactation Text: This medication is Pregnancy Category D and not consider safe during pregnancy. It is also excreted in breast milk but is considered safe for shorter treatment courses.
High Dose Vitamin A Counseling: Side effects reviewed, pt to contact office should one occur.
Detail Level: Detailed
Erythromycin Pregnancy And Lactation Text: This medication is Pregnancy Category B and is considered safe during pregnancy. It is also excreted in breast milk.
Benzoyl Peroxide Pregnancy And Lactation Text: This medication is Pregnancy Category C. It is unknown if benzoyl peroxide is excreted in breast milk.
Sarecycline Counseling: Patient advised regarding possible photosensitivity and discoloration of the teeth, skin, lips, tongue and gums.  Patient instructed to avoid sunlight, if possible.  When exposed to sunlight, patients should wear protective clothing, sunglasses, and sunscreen.  The patient was instructed to call the office immediately if the following severe adverse effects occur:  hearing changes, easy bruising/bleeding, severe headache, or vision changes.  The patient verbalized understanding of the proper use and possible adverse effects of sarecycline.  All of the patient's questions and concerns were addressed.
Spironolactone Pregnancy And Lactation Text: This medication can cause feminization of the male fetus and should be avoided during pregnancy. The active metabolite is also found in breast milk.
Azithromycin Pregnancy And Lactation Text: This medication is considered safe during pregnancy and is also secreted in breast milk.
Dapsone Counseling: I discussed with the patient the risks of dapsone including but not limited to hemolytic anemia, agranulocytosis, rashes, methemoglobinemia, kidney failure, peripheral neuropathy, headaches, GI upset, and liver toxicity.  Patients who start dapsone require monitoring including baseline LFTs and weekly CBCs for the first month, then every month thereafter.  The patient verbalized understanding of the proper use and possible adverse effects of dapsone.  All of the patient's questions and concerns were addressed.
Birth Control Pills Pregnancy And Lactation Text: This medication should be avoided if pregnant and for the first 30 days post-partum.
Bactrim Pregnancy And Lactation Text: This medication is Pregnancy Category D and is known to cause fetal risk.  It is also excreted in breast milk.
Erythromycin Counseling:  I discussed with the patient the risks of erythromycin including but not limited to GI upset, allergic reaction, drug rash, diarrhea, increase in liver enzymes, and yeast infections.
Isotretinoin Counseling: Patient should get monthly blood tests, not donate blood, not drive at night if vision affected, not share medication, and not undergo elective surgery for 6 months after tx completed. Side effects reviewed, pt to contact office should one occur.
Spironolactone Counseling: Patient advised regarding risks of diarrhea, abdominal pain, hyperkalemia, birth defects (for female patients), liver toxicity and renal toxicity. The patient may need blood work to monitor liver and kidney function and potassium levels while on therapy. The patient verbalized understanding of the proper use and possible adverse effects of spironolactone.  All of the patient's questions and concerns were addressed.
Isotretinoin Pregnancy And Lactation Text: This medication is Pregnancy Category X and is considered extremely dangerous during pregnancy. It is unknown if it is excreted in breast milk.
Tazorac Counseling:  Patient advised that medication is irritating and drying.  Patient may need to apply sparingly and wash off after an hour before eventually leaving it on overnight.  The patient verbalized understanding of the proper use and possible adverse effects of tazorac.  All of the patient's questions and concerns were addressed.
Topical Clindamycin Counseling: Patient counseled that this medication may cause skin irritation or allergic reactions.  In the event of skin irritation, the patient was advised to reduce the amount of the drug applied or use it less frequently.   The patient verbalized understanding of the proper use and possible adverse effects of clindamycin.  All of the patient's questions and concerns were addressed.
Dapsone Pregnancy And Lactation Text: This medication is Pregnancy Category C and is not considered safe during pregnancy or breast feeding.

## 2020-10-22 NOTE — PROCEDURE: ISOTRETINOIN MONITORING
Any Hypertriglyceridemia?: No
Myalgia Treatment: I explained this is common when taking isotretinoin. If this worsens they will contact us. They may try OTC ibuprofen.
Retinoid Dermatitis Normal Treatment: I recommended more frequent application of Cetaphil or CeraVe to the areas of dermatitis.
Weight Units: pounds
Retinoid Dermatitis Aggressive Treatment: I recommended more frequent application of Cetaphil or CeraVe to the areas of dermatitis. I also prescribed a topical steroid for twice daily use until the dermatitis resolves.
Completed Therapy?: Yes
Hypertriglyceridemia Monitoring: I explained this is common when taking isotretinoin. If this worsens they will contact us.
Hypercholesterolemia Monitoring: I explained this is common when taking isotretinoin. We will monitor closely.
Next Month's Dosage: Continue Current Dosage
Lower Range (In Mg/Kg): 120
Cheilitis Normal Treatment: I recommended application of Vaseline or Aquaphor numerous times a day (as often as every hour) and before going to bed.
Pounds Preamble Statement (Weight Entered In Details Tab): Reported Weight in pounds:
Xerosis Aggressive Treatment: I recommended application of Cetaphil or CeraVe numerous times a day and before going to bed to all dry areas. I also prescribed a topical steroid for twice daily use.
Female Completion Statement: After discussing her treatment course we decided to discontinue isotretinoin therapy at this time. I explained that she would need to continue her birth control methods for at least one month after the last dosage. She should also get a pregnancy test one month after the last dose. She shouldn't donate blood for one month after the last dose. She should call with any new symptoms of depression.
Dosing Month 1 (Required For Cumulative Dosing): 30mg BID
Cheilitis Aggressive Treatment: I recommended application of Vaseline or Aquaphor numerous times a day (as often as every hour) and before going to bed. I also prescribed a topical steroid for twice daily use.
Xerosis Aggressive Treatment: I recommended application of Cetaphil or CeraVe numerous times a day going to bed to all dry areas. I also prescribed a topical steroid for twice daily use.
Detail Level: Detailed
What Is The Patient's Gender: Female
Headache Monitoring: I recommended monitoring the headaches for now. There is no evidence of increased intracranial pressure. They were instructed to call if the headaches are worsening.
Are Labs Available For Review?: No- Not Drawn Yet
Ipledge Number (Optional): [id number]
Months Of Therapy Completed: 5
Nosebleeds Normal Treatment: I explained this is common when taking isotretinoin. I recommended saline mist in each nostril multiple times a day. If this worsens they will contact us.
Male Completion Statement: After discussing his treatment course we decided to discontinue isotretinoin therapy at this time. He shouldn't donate blood for one month after the last dose. He should call with any new symptoms of depression.
Kilograms Preamble Statement (Weight Entered In Details Tab): Reported Weight in kilograms:
Counseling Text: I reviewed the side effect in detail. Patient should get monthly blood tests, not donate blood, not drive at night if vision affected, and not share medication.
Use Therapeutic Ranged Or Therapeutic Target: please select Range or Target
Dosing Month 6 (Required For Cumulative Dosing): Discontinue
Xerosis Normal Treatment: I recommended application of Cetaphil or CeraVe numerous times a day going to bed to all dry areas.
Patient Weight (Optional But Required For Cumulative Dose-Numbers And Decimals Only): 140
Xerosis Normal Treatment: I recommended application of Cetaphil or CeraVe numerous times a day and before going to bed to all dry areas.
Is Xerosis Present?: Yes - Normal Treatment
Target Cumulative Dosage (In Mg/Kg): 135
Upper Range (In Mg/Kg): 150
Female Pregnancy Counseling Text: Female patients should also be on two forms of birth control while taking this medication and for one month after their last dose.

## 2020-10-22 NOTE — PROCEDURE: HIGH RISK MEDICATION MONITORING
Solaraze Pregnancy And Lactation Text: This medication is Pregnancy Category B and is considered safe. There is some data to suggest avoiding during the third trimester. It is unknown if this medication is excreted in breast milk.
Tazorac Pregnancy And Lactation Text: This medication is not safe during pregnancy. It is unknown if this medication is excreted in breast milk.
Sski Pregnancy And Lactation Text: This medication is Pregnancy Category D and isn't considered safe during pregnancy. It is excreted in breast milk.
Taltz Pregnancy And Lactation Text: The risk during pregnancy and breastfeeding is uncertain with this medication.
Hydroquinone Counseling:  Patient advised that medication may result in skin irritation, lightening (hypopigmentation), dryness, and burning.  In the event of skin irritation, the patient was advised to reduce the amount of the drug applied or use it less frequently.  Rarely, spots that are treated with hydroquinone can become darker (pseudoochronosis).  Should this occur, patient instructed to stop medication and call the office. The patient verbalized understanding of the proper use and possible adverse effects of hydroquinone.  All of the patient's questions and concerns were addressed.
Bactrim Pregnancy And Lactation Text: This medication is Pregnancy Category D and is known to cause fetal risk.  It is also excreted in breast milk.
Prednisone Counseling:  I discussed with the patient the risks of prolonged use of prednisone including but not limited to weight gain, insomnia, osteoporosis, mood changes, diabetes, susceptibility to infection, glaucoma and high blood pressure.  In cases where prednisone use is prolonged, patients should be monitored with blood pressure checks, serum glucose levels and an eye exam.  Additionally, the patient may need to be placed on GI prophylaxis, PCP prophylaxis, and calcium and vitamin D supplementation and/or a bisphosphonate.  The patient verbalized understanding of the proper use and the possible adverse effects of prednisone.  All of the patient's questions and concerns were addressed.
Humira Pregnancy And Lactation Text: This medication is Pregnancy Category B and is considered safe during pregnancy. It is unknown if this medication is excreted in breast milk.
Hydroxyzine Pregnancy And Lactation Text: This medication is not safe during pregnancy and should not be taken. It is also excreted in breast milk and breast feeding isn't recommended.
Humira Counseling:  I discussed with the patient the risks of adalimumab including but not limited to myelosuppression, immunosuppression, autoimmune hepatitis, demyelinating diseases, lymphoma, and serious infections.  The patient understands that monitoring is required including a PPD at baseline and must alert us or the primary physician if symptoms of infection or other concerning signs are noted.
Griseofulvin Counseling:  I discussed with the patient the risks of griseofulvin including but not limited to photosensitivity, cytopenia, liver damage, nausea/vomiting and severe allergy.  The patient understands that this medication is best absorbed when taken with a fatty meal (e.g., ice cream or french fries).
Ivermectin Counseling:  Patient instructed to take medication on an empty stomach with a full glass of water.  Patient informed of potential adverse effects including but not limited to nausea, diarrhea, dizziness, itching, and swelling of the extremities or lymph nodes.  The patient verbalized understanding of the proper use and possible adverse effects of ivermectin.  All of the patient's questions and concerns were addressed.
Valtrex Counseling: I discussed with the patient the risks of valacyclovir including but not limited to kidney damage, nausea, vomiting and severe allergy.  The patient understands that if the infection seems to be worsening or is not improving, they are to call.
Protopic Counseling: Patient may experience a mild burning sensation during topical application. Protopic is not approved in children less than 2 years of age. There have been case reports of hematologic and skin malignancies in patients using topical calcineurin inhibitors although causality is questionable.
5-Fu Counseling: 5-Fluorouracil Counseling:  I discussed with the patient the risks of 5-fluorouracil including but not limited to erythema, scaling, itching, weeping, crusting, and pain.
Clindamycin Pregnancy And Lactation Text: This medication can be used in pregnancy if certain situations. Clindamycin is also present in breast milk.
Odomzo Pregnancy And Lactation Text: This medication is Pregnancy Category X and is absolutely contraindicated during pregnancy. It is unknown if it is excreted in breast milk.
Albendazole Counseling:  I discussed with the patient the risks of albendazole including but not limited to cytopenia, kidney damage, nausea/vomiting and severe allergy.  The patient understands that this medication is being used in an off-label manner.
Cimzia Counseling:  I discussed with the patient the risks of Cimzia including but not limited to immunosuppression, allergic reactions and infections.  The patient understands that monitoring is required including a PPD at baseline and must alert us or the primary physician if symptoms of infection or other concerning signs are noted.
Oxybutynin Counseling:  I discussed with the patient the risks of oxybutynin including but not limited to skin rash, drowsiness, dry mouth, difficulty urinating, and blurred vision.
Include Pregnancy/Lactation Warning?: No
Rifampin Pregnancy And Lactation Text: This medication is Pregnancy Category C and it isn't know if it is safe during pregnancy. It is also excreted in breast milk and should not be used if you are breast feeding.
Sarecycline Pregnancy And Lactation Text: This medication is Pregnancy Category D and not consider safe during pregnancy. It is also excreted in breast milk.
Dapsone Counseling: I discussed with the patient the risks of dapsone including but not limited to hemolytic anemia, agranulocytosis, rashes, methemoglobinemia, kidney failure, peripheral neuropathy, headaches, GI upset, and liver toxicity.  Patients who start dapsone require monitoring including baseline LFTs and weekly CBCs for the first month, then every month thereafter.  The patient verbalized understanding of the proper use and possible adverse effects of dapsone.  All of the patient's questions and concerns were addressed.
Xolair Pregnancy And Lactation Text: This medication is Pregnancy Category B and is considered safe during pregnancy. This medication is excreted in breast milk.
Birth Control Pills Pregnancy And Lactation Text: This medication should be avoided if pregnant and for the first 30 days post-partum.
Glycopyrrolate Pregnancy And Lactation Text: This medication is Pregnancy Category B and is considered safe during pregnancy. It is unknown if it is excreted breast milk.
Azithromycin Pregnancy And Lactation Text: This medication is considered safe during pregnancy and is also secreted in breast milk.
Minocycline Counseling: Patient advised regarding possible photosensitivity and discoloration of the teeth, skin, lips, tongue and gums.  Patient instructed to avoid sunlight, if possible.  When exposed to sunlight, patients should wear protective clothing, sunglasses, and sunscreen.  The patient was instructed to call the office immediately if the following severe adverse effects occur:  hearing changes, easy bruising/bleeding, severe headache, or vision changes.  The patient verbalized understanding of the proper use and possible adverse effects of minocycline.  All of the patient's questions and concerns were addressed.
Benzoyl Peroxide Pregnancy And Lactation Text: This medication is Pregnancy Category C. It is unknown if benzoyl peroxide is excreted in breast milk.
Skyrizi Counseling: I discussed with the patient the risks of risankizumab-rzaa including but not limited to immunosuppression, and serious infections.  The patient understands that monitoring is required including a PPD at baseline and must alert us or the primary physician if symptoms of infection or other concerning signs are noted.
Imiquimod Pregnancy And Lactation Text: This medication is Pregnancy Category C. It is unknown if this medication is excreted in breast milk.
Simponi Counseling:  I discussed with the patient the risks of golimumab including but not limited to myelosuppression, immunosuppression, autoimmune hepatitis, demyelinating diseases, lymphoma, and serious infections.  The patient understands that monitoring is required including a PPD at baseline and must alert us or the primary physician if symptoms of infection or other concerning signs are noted.
Erythromycin Counseling:  I discussed with the patient the risks of erythromycin including but not limited to GI upset, allergic reaction, drug rash, diarrhea, increase in liver enzymes, and yeast infections.
Tazorac Counseling:  Patient advised that medication is irritating and drying.  Patient may need to apply sparingly and wash off after an hour before eventually leaving it on overnight.  The patient verbalized understanding of the proper use and possible adverse effects of tazorac.  All of the patient's questions and concerns were addressed.
SSKI Counseling:  I discussed with the patient the risks of SSKI including but not limited to thyroid abnormalities, metallic taste, GI upset, fever, headache, acne, arthralgias, paraesthesias, lymphadenopathy, easy bleeding, arrhythmias, and allergic reaction.
Clofazimine Pregnancy And Lactation Text: This medication is Pregnancy Category C and isn't considered safe during pregnancy. It is excreted in breast milk.
Eucrisa Counseling: Patient may experience a mild burning sensation during topical application. Eucrisa is not approved in children less than 2 years of age.
Solaraze Counseling:  I discussed with the patient the risks of Solaraze including but not limited to erythema, scaling, itching, weeping, crusting, and pain.
Dapsone Pregnancy And Lactation Text: This medication is Pregnancy Category C and is not considered safe during pregnancy or breast feeding.
Hydroxyzine Counseling: Patient advised that the medication is sedating and not to drive a car after taking this medication.  Patient informed of potential adverse effects including but not limited to dry mouth, urinary retention, and blurry vision.  The patient verbalized understanding of the proper use and possible adverse effects of hydroxyzine.  All of the patient's questions and concerns were addressed.
Azithromycin Counseling:  I discussed with the patient the risks of azithromycin including but not limited to GI upset, allergic reaction, drug rash, diarrhea, and yeast infections.
Topical Sulfur Applications Counseling: Topical Sulfur Counseling: Patient counseled that this medication may cause skin irritation or allergic reactions.  In the event of skin irritation, the patient was advised to reduce the amount of the drug applied or use it less frequently.   The patient verbalized understanding of the proper use and possible adverse effects of topical sulfur application.  All of the patient's questions and concerns were addressed.
Clofazimine Counseling:  I discussed with the patient the risks of clofazimine including but not limited to skin and eye pigmentation, liver damage, nausea/vomiting, gastrointestinal bleeding and allergy.
Acitretin Counseling:  I discussed with the patient the risks of acitretin including but not limited to hair loss, dry lips/skin/eyes, liver damage, hyperlipidemia, depression/suicidal ideation, photosensitivity.  Serious rare side effects can include but are not limited to pancreatitis, pseudotumor cerebri, bony changes, clot formation/stroke/heart attack.  Patient understands that alcohol is contraindicated since it can result in liver toxicity and significantly prolong the elimination of the drug by many years.
Itraconazole Counseling:  I discussed with the patient the risks of itraconazole including but not limited to liver damage, nausea/vomiting, neuropathy, and severe allergy.  The patient understands that this medication is best absorbed when taken with acidic beverages such as non-diet cola or ginger ale.  The patient understands that monitoring is required including baseline LFTs and repeat LFTs at intervals.  The patient understands that they are to contact us or the primary physician if concerning signs are noted.
High Dose Vitamin A Pregnancy And Lactation Text: High dose vitamin A therapy is contraindicated during pregnancy and breast feeding.
Picato Counseling:  I discussed with the patient the risks of Picato including but not limited to erythema, scaling, itching, weeping, crusting, and pain.
Colchicine Counseling:  Patient counseled regarding adverse effects including but not limited to stomach upset (nausea, vomiting, stomach pain, or diarrhea).  Patient instructed to limit alcohol consumption while taking this medication.  Colchicine may reduce blood counts especially with prolonged use.  The patient understands that monitoring of kidney function and blood counts may be required, especially at baseline. The patient verbalized understanding of the proper use and possible adverse effects of colchicine.  All of the patient's questions and concerns were addressed.
Minoxidil Counseling: Minoxidil is a topical medication which can increase blood flow where it is applied. It is uncertain how this medication increases hair growth. Side effects are uncommon and include stinging and allergic reactions.
Griseofulvin Pregnancy And Lactation Text: This medication is Pregnancy Category X and is known to cause serious birth defects. It is unknown if this medication is excreted in breast milk but breast feeding should be avoided.
Detail Level: Detailed
Acitretin Pregnancy And Lactation Text: This medication is Pregnancy Category X and should not be given to women who are pregnant or may become pregnant in the future. This medication is excreted in breast milk.
Cimetidine Pregnancy And Lactation Text: This medication is Pregnancy Category B and is considered safe during pregnancy. It is also excreted in breast milk and breast feeding isn't recommended.
Minoxidil Pregnancy And Lactation Text: This medication has not been assigned a Pregnancy Risk Category but animal studies failed to show danger with the topical medication. It is unknown if the medication is excreted in breast milk.
Gabapentin Counseling: I discussed with the patient the risks of gabapentin including but not limited to dizziness, somnolence, fatigue and ataxia.
Metronidazole Pregnancy And Lactation Text: This medication is Pregnancy Category B and considered safe during pregnancy.  It is also excreted in breast milk.
Quinolones Pregnancy And Lactation Text: This medication is Pregnancy Category C and it isn't know if it is safe during pregnancy. It is also excreted in breast milk.
Ilumya Counseling: I discussed with the patient the risks of tildrakizumab including but not limited to immunosuppression, malignancy, posterior leukoencephalopathy syndrome, and serious infections.  The patient understands that monitoring is required including a PPD at baseline and must alert us or the primary physician if symptoms of infection or other concerning signs are noted.
Rifampin Counseling: I discussed with the patient the risks of rifampin including but not limited to liver damage, kidney damage, red-orange body fluids, nausea/vomiting and severe allergy.
Isotretinoin Counseling: Patient should get monthly blood tests, not donate blood, not drive at night if vision affected, not share medication, and not undergo elective surgery for 6 months after tx completed. Side effects reviewed, pt to contact office should one occur.
Cyclosporine Counseling:  I discussed with the patient the risks of cyclosporine including but not limited to hypertension, gingival hyperplasia,myelosuppression, immunosuppression, liver damage, kidney damage, neurotoxicity, lymphoma, and serious infections. The patient understands that monitoring is required including baseline blood pressure, CBC, CMP, lipid panel and uric acid, and then 1-2 times monthly CMP and blood pressure.
Terbinafine Counseling: Patient counseling regarding adverse effects of terbinafine including but not limited to headache, diarrhea, rash, upset stomach, liver function test abnormalities, itching, taste/smell disturbance, nausea, abdominal pain, and flatulence.  There is a rare possibility of liver failure that can occur when taking terbinafine.  The patient understands that a baseline LFT and kidney function test may be required. The patient verbalized understanding of the proper use and possible adverse effects of terbinafine.  All of the patient's questions and concerns were addressed.
Topical Sulfur Applications Pregnancy And Lactation Text: This medication is Pregnancy Category C and has an unknown safety profile during pregnancy. It is unknown if this topical medication is excreted in breast milk.
Ivermectin Pregnancy And Lactation Text: This medication is Pregnancy Category C and it isn't known if it is safe during pregnancy. It is also excreted in breast milk.
Doxycycline Counseling:  Patient counseled regarding possible photosensitivity and increased risk for sunburn.  Patient instructed to avoid sunlight, if possible.  When exposed to sunlight, patients should wear protective clothing, sunglasses, and sunscreen.  The patient was instructed to call the office immediately if the following severe adverse effects occur:  hearing changes, easy bruising/bleeding, severe headache, or vision changes.  The patient verbalized understanding of the proper use and possible adverse effects of doxycycline.  All of the patient's questions and concerns were addressed.
Cyclophosphamide Pregnancy And Lactation Text: This medication is Pregnancy Category D and it isn't considered safe during pregnancy. This medication is excreted in breast milk.
Azathioprine Pregnancy And Lactation Text: This medication is Pregnancy Category D and isn't considered safe during pregnancy. It is unknown if this medication is excreted in breast milk.
Birth Control Pills Counseling: Birth Control Pill Counseling: I discussed with the patient the potential side effects of OCPs including but not limited to increased risk of stroke, heart attack, thrombophlebitis, deep venous thrombosis, hepatic adenomas, breast changes, GI upset, headaches, and depression.  The patient verbalized understanding of the proper use and possible adverse effects of OCPs. All of the patient's questions and concerns were addressed.
Ketoconazole Counseling:   Patient counseled regarding improving absorption with orange juice.  Adverse effects include but are not limited to breast enlargement, headache, diarrhea, nausea, upset stomach, liver function test abnormalities, taste disturbance, and stomach pain.  There is a rare possibility of liver failure that can occur when taking ketoconazole. The patient understands that monitoring of LFTs may be required, especially at baseline. The patient verbalized understanding of the proper use and possible adverse effects of ketoconazole.  All of the patient's questions and concerns were addressed.
Cyclosporine Pregnancy And Lactation Text: This medication is Pregnancy Category C and it isn't know if it is safe during pregnancy. This medication is excreted in breast milk.
Topical Clindamycin Pregnancy And Lactation Text: This medication is Pregnancy Category B and is considered safe during pregnancy. It is unknown if it is excreted in breast milk.
Cephalexin Counseling: I counseled the patient regarding use of cephalexin as an antibiotic for prophylactic and/or therapeutic purposes. Cephalexin (commonly prescribed under brand name Keflex) is a cephalosporin antibiotic which is active against numerous classes of bacteria, including most skin bacteria. Side effects may include nausea, diarrhea, gastrointestinal upset, rash, hives, yeast infections, and in rare cases, hepatitis, kidney disease, seizures, fever, confusion, neurologic symptoms, and others. Patients with severe allergies to penicillin medications are cautioned that there is about a 10% incidence of cross-reactivity with cephalosporins. When possible, patients with penicillin allergies should use alternatives to cephalosporins for antibiotic therapy.
Drysol Pregnancy And Lactation Text: This medication is considered safe during pregnancy and breast feeding.
Rituxan Counseling:  I discussed with the patient the risks of Rituxan infusions. Side effects can include infusion reactions, severe drug rashes including mucocutaneous reactions, reactivation of latent hepatitis and other infections and rarely progressive multifocal leukoencephalopathy.  All of the patient's questions and concerns were addressed.
Hydroxychloroquine Pregnancy And Lactation Text: This medication has been shown to cause fetal harm but it isn't assigned a Pregnancy Risk Category. There are small amounts excreted in breast milk.
Cellcept Counseling:  I discussed with the patient the risks of mycophenolate mofetil including but not limited to infection/immunosuppression, GI upset, hypokalemia, hypercholesterolemia, bone marrow suppression, lymphoproliferative disorders, malignancy, GI ulceration/bleed/perforation, colitis, interstitial lung disease, kidney failure, progressive multifocal leukoencephalopathy, and birth defects.  The patient understands that monitoring is required including a baseline creatinine and regular CBC testing. In addition, patient must alert us immediately if symptoms of infection or other concerning signs are noted.
High Dose Vitamin A Counseling: Side effects reviewed, pt to contact office should one occur.
Cimzia Pregnancy And Lactation Text: This medication crosses the placenta but can be considered safe in certain situations. Cimzia may be excreted in breast milk.
Xeljanz Counseling: I discussed with the patient the risks of Xeljanz therapy including increased risk of infection, liver issues, headache, diarrhea, or cold symptoms. Live vaccines should be avoided. They were instructed to call if they have any problems.
Nsaids Pregnancy And Lactation Text: These medications are considered safe up to 30 weeks gestation. It is excreted in breast milk.
Carac Pregnancy And Lactation Text: This medication is Pregnancy Category X and contraindicated in pregnancy and in women who may become pregnant. It is unknown if this medication is excreted in breast milk.
Doxepin Pregnancy And Lactation Text: This medication is Pregnancy Category C and it isn't known if it is safe during pregnancy. It is also excreted in breast milk and breast feeding isn't recommended.
Dupixent Counseling: I discussed with the patient the risks of dupilumab including but not limited to eye infection and irritation, cold sores, injection site reactions, worsening of asthma, allergic reactions and increased risk of parasitic infection.  Live vaccines should be avoided while taking dupilumab. Dupilumab will also interact with certain medications such as warfarin and cyclosporine. The patient understands that monitoring is required and they must alert us or the primary physician if symptoms of infection or other concerning signs are noted.
Bexarotene Pregnancy And Lactation Text: This medication is Pregnancy Category X and should not be given to women who are pregnant or may become pregnant. This medication should not be used if you are breast feeding.
Quinolones Counseling:  I discussed with the patient the risks of fluoroquinolones including but not limited to GI upset, allergic reaction, drug rash, diarrhea, dizziness, photosensitivity, yeast infections, liver function test abnormalities, tendonitis/tendon rupture.
Infliximab Counseling:  I discussed with the patient the risks of infliximab including but not limited to myelosuppression, immunosuppression, autoimmune hepatitis, demyelinating diseases, lymphoma, and serious infections.  The patient understands that monitoring is required including a PPD at baseline and must alert us or the primary physician if symptoms of infection or other concerning signs are noted.
Tremfya Counseling: I discussed with the patient the risks of guselkumab including but not limited to immunosuppression, serious infections, worsening of inflammatory bowel disease and drug reactions.  The patient understands that monitoring is required including a PPD at baseline and must alert us or the primary physician if symptoms of infection or other concerning signs are noted.
Clindamycin Counseling: I counseled the patient regarding use of clindamycin as an antibiotic for prophylactic and/or therapeutic purposes. Clindamycin is active against numerous classes of bacteria, including skin bacteria. Side effects may include nausea, diarrhea, gastrointestinal upset, rash, hives, yeast infections, and in rare cases, colitis.
Cosentyx Counseling:  I discussed with the patient the risks of Cosentyx including but not limited to worsening of Crohn's disease, immunosuppression, allergic reactions and infections.  The patient understands that monitoring is required including a PPD at baseline and must alert us or the primary physician if symptoms of infection or other concerning signs are noted.
Isotretinoin Pregnancy And Lactation Text: This medication is Pregnancy Category X and is considered extremely dangerous during pregnancy. It is unknown if it is excreted in breast milk.
Zyclara Counseling:  I discussed with the patient the risks of imiquimod including but not limited to erythema, scaling, itching, weeping, crusting, and pain.  Patient understands that the inflammatory response to imiquimod is variable from person to person and was educated regarded proper titration schedule.  If flu-like symptoms develop, patient knows to discontinue the medication and contact us.
Spironolactone Pregnancy And Lactation Text: This medication can cause feminization of the male fetus and should be avoided during pregnancy. The active metabolite is also found in breast milk.
Arava Counseling:  Patient counseled regarding adverse effects of Arava including but not limited to nausea, vomiting, abnormalities in liver function tests. Patients may develop mouth sores, rash, diarrhea, and abnormalities in blood counts. The patient understands that monitoring is required including LFTs and blood counts.  There is a rare possibility of scarring of the liver and lung problems that can occur when taking methotrexate. Persistent nausea, loss of appetite, pale stools, dark urine, cough, and shortness of breath should be reported immediately. Patient advised to discontinue Arava treatment and consult with a physician prior to attempting conception. The patient will have to undergo a treatment to eliminate Arava from the body prior to conception.
Methotrexate Pregnancy And Lactation Text: This medication is Pregnancy Category X and is known to cause fetal harm. This medication is excreted in breast milk.
Tetracycline Counseling: Patient counseled regarding possible photosensitivity and increased risk for sunburn.  Patient instructed to avoid sunlight, if possible.  When exposed to sunlight, patients should wear protective clothing, sunglasses, and sunscreen.  The patient was instructed to call the office immediately if the following severe adverse effects occur:  hearing changes, easy bruising/bleeding, severe headache, or vision changes.  The patient verbalized understanding of the proper use and possible adverse effects of tetracycline.  All of the patient's questions and concerns were addressed. Patient understands to avoid pregnancy while on therapy due to potential birth defects.
Cimetidine Counseling:  I discussed with the patient the risks of Cimetidine including but not limited to gynecomastia, headache, diarrhea, nausea, drowsiness, arrhythmias, pancreatitis, skin rashes, psychosis, bone marrow suppression and kidney toxicity.
Thalidomide Counseling: I discussed with the patient the risks of thalidomide including but not limited to birth defects, anxiety, weakness, chest pain, dizziness, cough and severe allergy.
Spironolactone Counseling: Patient advised regarding risks of diarrhea, abdominal pain, hyperkalemia, birth defects (for female patients), liver toxicity and renal toxicity. The patient may need blood work to monitor liver and kidney function and potassium levels while on therapy. The patient verbalized understanding of the proper use and possible adverse effects of spironolactone.  All of the patient's questions and concerns were addressed.
Cephalexin Pregnancy And Lactation Text: This medication is Pregnancy Category B and considered safe during pregnancy.  It is also excreted in breast milk but can be used safely for shorter doses.
Nsaids Counseling: NSAID Counseling: I discussed with the patient that NSAIDs should be taken with food. Prolonged use of NSAIDs can result in the development of stomach ulcers.  Patient advised to stop taking NSAIDs if abdominal pain occurs.  The patient verbalized understanding of the proper use and possible adverse effects of NSAIDs.  All of the patient's questions and concerns were addressed.
Hydroxychloroquine Counseling:  I discussed with the patient that a baseline ophthalmologic exam is needed at the start of therapy and every year thereafter while on therapy. A CBC may also be warranted for monitoring.  The side effects of this medication were discussed with the patient, including but not limited to agranulocytosis, aplastic anemia, seizures, rashes, retinopathy, and liver toxicity. Patient instructed to call the office should any adverse effect occur.  The patient verbalized understanding of the proper use and possible adverse effects of Plaquenil.  All the patient's questions and concerns were addressed.
Protopic Pregnancy And Lactation Text: This medication is Pregnancy Category C. It is unknown if this medication is excreted in breast milk when applied topically.
Metronidazole Counseling:  I discussed with the patient the risks of metronidazole including but not limited to seizures, nausea/vomiting, a metallic taste in the mouth, nausea/vomiting and severe allergy.
Siliq Counseling:  I discussed with the patient the risks of Siliq including but not limited to new or worsening depression, suicidal thoughts and behavior, immunosuppression, malignancy, posterior leukoencephalopathy syndrome, and serious infections.  The patient understands that monitoring is required including a PPD at baseline and must alert us or the primary physician if symptoms of infection or other concerning signs are noted. There is also a special program designed to monitor depression which is required with Siliq.
Benzoyl Peroxide Counseling: Patient counseled that medicine may cause skin irritation and bleach clothing.  In the event of skin irritation, the patient was advised to reduce the amount of the drug applied or use it less frequently.   The patient verbalized understanding of the proper use and possible adverse effects of benzoyl peroxide.  All of the patient's questions and concerns were addressed.
Xeljonathanz Pregnancy And Lactation Text: This medication is Pregnancy Category D and is not considered safe during pregnancy.  The risk during breast feeding is also uncertain.
Sarecycline Counseling: Patient advised regarding possible photosensitivity and discoloration of the teeth, skin, lips, tongue and gums.  Patient instructed to avoid sunlight, if possible.  When exposed to sunlight, patients should wear protective clothing, sunglasses, and sunscreen.  The patient was instructed to call the office immediately if the following severe adverse effects occur:  hearing changes, easy bruising/bleeding, severe headache, or vision changes.  The patient verbalized understanding of the proper use and possible adverse effects of sarecycline.  All of the patient's questions and concerns were addressed.
Otezla Pregnancy And Lactation Text: This medication is Pregnancy Category C and it isn't known if it is safe during pregnancy. It is unknown if it is excreted in breast milk.
Drysol Counseling:  I discussed with the patient the risks of drysol/aluminum chloride including but not limited to skin rash, itching, irritation, burning.
Topical Clindamycin Counseling: Patient counseled that this medication may cause skin irritation or allergic reactions.  In the event of skin irritation, the patient was advised to reduce the amount of the drug applied or use it less frequently.   The patient verbalized understanding of the proper use and possible adverse effects of clindamycin.  All of the patient's questions and concerns were addressed.
Enbrel Counseling:  I discussed with the patient the risks of etanercept including but not limited to myelosuppression, immunosuppression, autoimmune hepatitis, demyelinating diseases, lymphoma, and infections.  The patient understands that monitoring is required including a PPD at baseline and must alert us or the primary physician if symptoms of infection or other concerning signs are noted.
Glycopyrrolate Counseling:  I discussed with the patient the risks of glycopyrrolate including but not limited to skin rash, drowsiness, dry mouth, difficulty urinating, and blurred vision.
Erivedge Counseling- I discussed with the patient the risks of Erivedge including but not limited to nausea, vomiting, diarrhea, constipation, weight loss, changes in the sense of taste, decreased appetite, muscle spasms, and hair loss.  The patient verbalized understanding of the proper use and possible adverse effects of Erivedge.  All of the patient's questions and concerns were addressed.
Ketoconazole Pregnancy And Lactation Text: This medication is Pregnancy Category C and it isn't know if it is safe during pregnancy. It is also excreted in breast milk and breast feeding isn't recommended.
Elidel Counseling: Patient may experience a mild burning sensation during topical application. Elidel is not approved in children less than 2 years of age. There have been case reports of hematologic and skin malignancies in patients using topical calcineurin inhibitors although causality is questionable.
Otezla Counseling: The side effects of Otezla were discussed with the patient, including but not limited to worsening or new depression, weight loss, diarrhea, nausea, upper respiratory tract infection, and headache. Patient instructed to call the office should any adverse effect occur.  The patient verbalized understanding of the proper use and possible adverse effects of Otezla.  All the patient's questions and concerns were addressed.
Azathioprine Counseling:  I discussed with the patient the risks of azathioprine including but not limited to myelosuppression, immunosuppression, hepatotoxicity, lymphoma, and infections.  The patient understands that monitoring is required including baseline LFTs, Creatinine, possible TPMP genotyping and weekly CBCs for the first month and then every 2 weeks thereafter.  The patient verbalized understanding of the proper use and possible adverse effects of azathioprine.  All of the patient's questions and concerns were addressed.
Imiquimod Counseling:  I discussed with the patient the risks of imiquimod including but not limited to erythema, scaling, itching, weeping, crusting, and pain.  Patient understands that the inflammatory response to imiquimod is variable from person to person and was educated regarded proper titration schedule.  If flu-like symptoms develop, patient knows to discontinue the medication and contact us.
Cyclophosphamide Counseling:  I discussed with the patient the risks of cyclophosphamide including but not limited to hair loss, hormonal abnormalities, decreased fertility, abdominal pain, diarrhea, nausea and vomiting, bone marrow suppression and infection. The patient understands that monitoring is required while taking this medication.
Dupixent Pregnancy And Lactation Text: This medication likely crosses the placenta but the risk for the fetus is uncertain. This medication is excreted in breast milk.
Doxepin Counseling:  Patient advised that the medication is sedating and not to drive a car after taking this medication. Patient informed of potential adverse effects including but not limited to dry mouth, urinary retention, and blurry vision.  The patient verbalized understanding of the proper use and possible adverse effects of doxepin.  All of the patient's questions and concerns were addressed.
Bactrim Counseling:  I discussed with the patient the risks of sulfa antibiotics including but not limited to GI upset, allergic reaction, drug rash, diarrhea, dizziness, photosensitivity, and yeast infections.  Rarely, more serious reactions can occur including but not limited to aplastic anemia, agranulocytosis, methemoglobinemia, blood dyscrasias, liver or kidney failure, lung infiltrates or desquamative/blistering drug rashes.
Taltz Counseling: I discussed with the patient the risks of ixekizumab including but not limited to immunosuppression, serious infections, worsening of inflammatory bowel disease and drug reactions.  The patient understands that monitoring is required including a PPD at baseline and must alert us or the primary physician if symptoms of infection or other concerning signs are noted.
Stelara Counseling:  I discussed with the patient the risks of ustekinumab including but not limited to immunosuppression, malignancy, posterior leukoencephalopathy syndrome, and serious infections.  The patient understands that monitoring is required including a PPD at baseline and must alert us or the primary physician if symptoms of infection or other concerning signs are noted.
Xolair Counseling:  Patient informed of potential adverse effects including but not limited to fever, muscle aches, rash and allergic reactions.  The patient verbalized understanding of the proper use and possible adverse effects of Xolair.  All of the patient's questions and concerns were addressed.
Topical Retinoid counseling:  Patient advised to apply a pea-sized amount only at bedtime and wait 30 minutes after washing their face before applying.  If too drying, patient may add a non-comedogenic moisturizer. The patient verbalized understanding of the proper use and possible adverse effects of retinoids.  All of the patient's questions and concerns were addressed.
Bexarotene Counseling:  I discussed with the patient the risks of bexarotene including but not limited to hair loss, dry lips/skin/eyes, liver abnormalities, hyperlipidemia, pancreatitis, depression/suicidal ideation, photosensitivity, drug rash/allergic reactions, hypothyroidism, anemia, leukopenia, infection, cataracts, and teratogenicity.  Patient understands that they will need regular blood tests to check lipid profile, liver function tests, white blood cell count, thyroid function tests and pregnancy test if applicable.
Methotrexate Counseling:  Patient counseled regarding adverse effects of methotrexate including but not limited to nausea, vomiting, abnormalities in liver function tests. Patients may develop mouth sores, rash, diarrhea, and abnormalities in blood counts. The patient understands that monitoring is required including LFT's and blood counts.  There is a rare possibility of scarring of the liver and lung problems that can occur when taking methotrexate. Persistent nausea, loss of appetite, pale stools, dark urine, cough, and shortness of breath should be reported immediately. Patient advised to discontinue methotrexate treatment at least three months before attempting to become pregnant.  I discussed the need for folate supplements while taking methotrexate.  These supplements can decrease side effects during methotrexate treatment. The patient verbalized understanding of the proper use and possible adverse effects of methotrexate.  All of the patient's questions and concerns were addressed.
Erythromycin Pregnancy And Lactation Text: This medication is Pregnancy Category B and is considered safe during pregnancy. It is also excreted in breast milk.
Rituxan Pregnancy And Lactation Text: This medication is Pregnancy Category C and it isn't know if it is safe during pregnancy. It is unknown if this medication is excreted in breast milk but similar antibodies are known to be excreted.
Carac Counseling:  I discussed with the patient the risks of Carac including but not limited to erythema, scaling, itching, weeping, crusting, and pain.
Odomzo Counseling- I discussed with the patient the risks of Odomzo including but not limited to nausea, vomiting, diarrhea, constipation, weight loss, changes in the sense of taste, decreased appetite, muscle spasms, and hair loss.  The patient verbalized understanding of the proper use and possible adverse effects of Odomzo.  All of the patient's questions and concerns were addressed.
Fluconazole Counseling:  Patient counseled regarding adverse effects of fluconazole including but not limited to headache, diarrhea, nausea, upset stomach, liver function test abnormalities, taste disturbance, and stomach pain.  There is a rare possibility of liver failure that can occur when taking fluconazole.  The patient understands that monitoring of LFTs and kidney function test may be required, especially at baseline. The patient verbalized understanding of the proper use and possible adverse effects of fluconazole.  All of the patient's questions and concerns were addressed.
Doxycycline Pregnancy And Lactation Text: This medication is Pregnancy Category D and not consider safe during pregnancy. It is also excreted in breast milk but is considered safe for shorter treatment courses.
Valtrex Pregnancy And Lactation Text: this medication is Pregnancy Category B and is considered safe during pregnancy. This medication is not directly found in breast milk but it's metabolite acyclovir is present.